# Patient Record
Sex: FEMALE
[De-identification: names, ages, dates, MRNs, and addresses within clinical notes are randomized per-mention and may not be internally consistent; named-entity substitution may affect disease eponyms.]

---

## 2018-03-14 ENCOUNTER — APPOINTMENT (OUTPATIENT)
Dept: INTERNAL MEDICINE | Facility: CLINIC | Age: 33
End: 2018-03-14
Payer: COMMERCIAL

## 2018-03-14 ENCOUNTER — LABORATORY RESULT (OUTPATIENT)
Age: 33
End: 2018-03-14

## 2018-03-14 VITALS
HEIGHT: 66 IN | SYSTOLIC BLOOD PRESSURE: 100 MMHG | OXYGEN SATURATION: 100 % | HEART RATE: 66 BPM | WEIGHT: 144 LBS | BODY MASS INDEX: 23.14 KG/M2 | TEMPERATURE: 98.6 F | DIASTOLIC BLOOD PRESSURE: 66 MMHG

## 2018-03-14 DIAGNOSIS — Z82.3 FAMILY HISTORY OF STROKE: ICD-10-CM

## 2018-03-14 DIAGNOSIS — Z83.3 FAMILY HISTORY OF DIABETES MELLITUS: ICD-10-CM

## 2018-03-14 DIAGNOSIS — Z82.49 FAMILY HISTORY OF ISCHEMIC HEART DISEASE AND OTHER DISEASES OF THE CIRCULATORY SYSTEM: ICD-10-CM

## 2018-03-14 DIAGNOSIS — Z80.0 FAMILY HISTORY OF MALIGNANT NEOPLASM OF DIGESTIVE ORGANS: ICD-10-CM

## 2018-03-14 DIAGNOSIS — Z80.3 FAMILY HISTORY OF MALIGNANT NEOPLASM OF BREAST: ICD-10-CM

## 2018-03-14 PROCEDURE — G0008: CPT

## 2018-03-14 PROCEDURE — 99203 OFFICE O/P NEW LOW 30 MIN: CPT | Mod: 25

## 2018-03-14 PROCEDURE — 36415 COLL VENOUS BLD VENIPUNCTURE: CPT

## 2018-03-14 PROCEDURE — 90688 IIV4 VACCINE SPLT 0.5 ML IM: CPT

## 2018-03-16 LAB
ALBUMIN SERPL ELPH-MCNC: 4.5 G/DL
ALP BLD-CCNC: 43 U/L
ALT SERPL-CCNC: 22 U/L
AMYLASE/CREAT SERPL: 111 U/L
ANION GAP SERPL CALC-SCNC: 12 MMOL/L
AST SERPL-CCNC: 38 U/L
BASOPHILS # BLD AUTO: 0.02 K/UL
BASOPHILS NFR BLD AUTO: 0.4 %
BILIRUB SERPL-MCNC: 0.2 MG/DL
BUN SERPL-MCNC: 8 MG/DL
CALCIUM SERPL-MCNC: 9.2 MG/DL
CHLORIDE SERPL-SCNC: 101 MMOL/L
CO2 SERPL-SCNC: 25 MMOL/L
CREAT SERPL-MCNC: 0.78 MG/DL
EOSINOPHIL # BLD AUTO: 0.1 K/UL
EOSINOPHIL NFR BLD AUTO: 2.2 %
GLUCOSE SERPL-MCNC: 87 MG/DL
HCT VFR BLD CALC: 43.3 %
HGB BLD-MCNC: 13.6 G/DL
IMM GRANULOCYTES NFR BLD AUTO: 0.2 %
LPL SERPL-CCNC: 68 U/L
LYMPHOCYTES # BLD AUTO: 2.1 K/UL
LYMPHOCYTES NFR BLD AUTO: 45.8 %
MAN DIFF?: NORMAL
MCHC RBC-ENTMCNC: 27 PG
MCHC RBC-ENTMCNC: 31.4 GM/DL
MCV RBC AUTO: 85.9 FL
MONOCYTES # BLD AUTO: 0.56 K/UL
MONOCYTES NFR BLD AUTO: 12.2 %
NEUTROPHILS # BLD AUTO: 1.8 K/UL
NEUTROPHILS NFR BLD AUTO: 39.2 %
PLATELET # BLD AUTO: 333 K/UL
POTASSIUM SERPL-SCNC: 3.9 MMOL/L
PROT SERPL-MCNC: 7.7 G/DL
RBC # BLD: 5.04 M/UL
RBC # FLD: 13 %
SODIUM SERPL-SCNC: 138 MMOL/L
WBC # FLD AUTO: 4.59 K/UL

## 2018-03-19 LAB — BACTERIA STL CULT: NORMAL

## 2018-03-20 ENCOUNTER — RESULT REVIEW (OUTPATIENT)
Age: 33
End: 2018-03-20

## 2018-05-06 ENCOUNTER — TRANSCRIPTION ENCOUNTER (OUTPATIENT)
Age: 33
End: 2018-05-06

## 2018-07-05 ENCOUNTER — APPOINTMENT (OUTPATIENT)
Dept: OPHTHALMOLOGY | Facility: CLINIC | Age: 33
End: 2018-07-05
Payer: COMMERCIAL

## 2018-07-05 ENCOUNTER — TRANSCRIPTION ENCOUNTER (OUTPATIENT)
Age: 33
End: 2018-07-05

## 2018-07-05 PROCEDURE — 92250 FUNDUS PHOTOGRAPHY W/I&R: CPT

## 2018-07-05 PROCEDURE — 92004 COMPRE OPH EXAM NEW PT 1/>: CPT

## 2018-09-04 ENCOUNTER — TRANSCRIPTION ENCOUNTER (OUTPATIENT)
Age: 33
End: 2018-09-04

## 2018-09-12 ENCOUNTER — APPOINTMENT (OUTPATIENT)
Dept: INTERNAL MEDICINE | Facility: CLINIC | Age: 33
End: 2018-09-12
Payer: COMMERCIAL

## 2018-09-12 VITALS
SYSTOLIC BLOOD PRESSURE: 100 MMHG | HEIGHT: 66 IN | WEIGHT: 148 LBS | DIASTOLIC BLOOD PRESSURE: 70 MMHG | BODY MASS INDEX: 23.78 KG/M2 | TEMPERATURE: 98.8 F

## 2018-09-12 PROCEDURE — 99213 OFFICE O/P EST LOW 20 MIN: CPT

## 2018-10-11 ENCOUNTER — APPOINTMENT (OUTPATIENT)
Dept: OPHTHALMOLOGY | Facility: CLINIC | Age: 33
End: 2018-10-11

## 2018-10-23 ENCOUNTER — APPOINTMENT (OUTPATIENT)
Dept: OBGYN | Facility: CLINIC | Age: 33
End: 2018-10-23

## 2018-11-01 ENCOUNTER — APPOINTMENT (OUTPATIENT)
Dept: OBGYN | Facility: CLINIC | Age: 33
End: 2018-11-01

## 2018-11-27 ENCOUNTER — APPOINTMENT (OUTPATIENT)
Dept: OPHTHALMOLOGY | Facility: CLINIC | Age: 33
End: 2018-11-27
Payer: COMMERCIAL

## 2018-11-27 PROCEDURE — 92012 INTRM OPH EXAM EST PATIENT: CPT

## 2018-11-27 PROCEDURE — 92083 EXTENDED VISUAL FIELD XM: CPT

## 2018-11-27 PROCEDURE — 92133 CPTRZD OPH DX IMG PST SGM ON: CPT

## 2018-12-11 ENCOUNTER — RESULT REVIEW (OUTPATIENT)
Age: 33
End: 2018-12-11

## 2018-12-12 ENCOUNTER — APPOINTMENT (OUTPATIENT)
Dept: INTERNAL MEDICINE | Facility: CLINIC | Age: 33
End: 2018-12-12
Payer: COMMERCIAL

## 2018-12-12 VITALS
WEIGHT: 148 LBS | DIASTOLIC BLOOD PRESSURE: 70 MMHG | HEIGHT: 66 IN | SYSTOLIC BLOOD PRESSURE: 100 MMHG | HEART RATE: 83 BPM | BODY MASS INDEX: 23.78 KG/M2 | OXYGEN SATURATION: 99 % | TEMPERATURE: 97.8 F

## 2018-12-12 DIAGNOSIS — Z87.09 PERSONAL HISTORY OF OTHER DISEASES OF THE RESPIRATORY SYSTEM: ICD-10-CM

## 2018-12-12 DIAGNOSIS — Z78.9 OTHER SPECIFIED HEALTH STATUS: ICD-10-CM

## 2018-12-12 DIAGNOSIS — B35.1 TINEA UNGUIUM: ICD-10-CM

## 2018-12-12 DIAGNOSIS — Z87.19 PERSONAL HISTORY OF OTHER DISEASES OF THE DIGESTIVE SYSTEM: ICD-10-CM

## 2018-12-12 DIAGNOSIS — Z83.511 FAMILY HISTORY OF GLAUCOMA: ICD-10-CM

## 2018-12-12 DIAGNOSIS — Z23 ENCOUNTER FOR IMMUNIZATION: ICD-10-CM

## 2018-12-12 PROCEDURE — G0008: CPT

## 2018-12-12 PROCEDURE — 90472 IMMUNIZATION ADMIN EACH ADD: CPT

## 2018-12-12 PROCEDURE — 90715 TDAP VACCINE 7 YRS/> IM: CPT

## 2018-12-12 PROCEDURE — 93000 ELECTROCARDIOGRAM COMPLETE: CPT

## 2018-12-12 PROCEDURE — 90674 CCIIV4 VAC NO PRSV 0.5 ML IM: CPT

## 2018-12-12 PROCEDURE — 36415 COLL VENOUS BLD VENIPUNCTURE: CPT

## 2018-12-12 PROCEDURE — 99395 PREV VISIT EST AGE 18-39: CPT | Mod: 25

## 2018-12-12 RX ORDER — FLUTICASONE PROPIONATE 50 UG/1
50 SPRAY, METERED NASAL DAILY
Qty: 1 | Refills: 0 | Status: ACTIVE | COMMUNITY
Start: 2018-12-12 | End: 1900-01-01

## 2018-12-12 RX ORDER — FLUTICASONE PROPIONATE 50 UG/1
50 SPRAY, METERED NASAL TWICE DAILY
Qty: 1 | Refills: 1 | Status: DISCONTINUED | COMMUNITY
Start: 2018-09-12 | End: 2018-12-12

## 2018-12-12 RX ORDER — METHYLPREDNISOLONE 4 MG/1
4 TABLET ORAL
Qty: 1 | Refills: 0 | Status: DISCONTINUED | COMMUNITY
Start: 2018-09-12 | End: 2018-12-12

## 2018-12-12 RX ORDER — LEVOFLOXACIN 500 MG/1
500 TABLET, FILM COATED ORAL DAILY
Qty: 7 | Refills: 1 | Status: DISCONTINUED | COMMUNITY
Start: 2018-09-12 | End: 2018-12-12

## 2018-12-12 RX ORDER — MULTIVITAMIN
TABLET ORAL
Refills: 0 | Status: ACTIVE | COMMUNITY

## 2018-12-12 NOTE — HEALTH RISK ASSESSMENT
[0] : 2) Feeling down, depressed, or hopeless: Not at all (0) [HIV Test offered] : HIV Test offered [MammogramComments] : has appt for mammo, US  [PapSmearDate] : 12/2018 [PapSmearComments] : done yest [de-identified] : last seen dentist 1 mo ago

## 2018-12-12 NOTE — PHYSICAL EXAM
[No Acute Distress] : no acute distress [Well Nourished] : well nourished [Well Developed] : well developed [Well-Appearing] : well-appearing [Normal Sclera/Conjunctiva] : normal sclera/conjunctiva [PERRL] : pupils equal round and reactive to light [Normal Outer Ear/Nose] : the outer ears and nose were normal in appearance [Normal Oropharynx] : the oropharynx was normal [Normal TMs] : both tympanic membranes were normal [Supple] : supple [No Lymphadenopathy] : no lymphadenopathy [No Respiratory Distress] : no respiratory distress  [Clear to Auscultation] : lungs were clear to auscultation bilaterally [No Accessory Muscle Use] : no accessory muscle use [Normal Rate] : normal rate  [Regular Rhythm] : with a regular rhythm [Normal S1, S2] : normal S1 and S2 [No Murmur] : no murmur heard [No Edema] : there was no peripheral edema [Soft] : abdomen soft [Non Tender] : non-tender [Non-distended] : non-distended [No Masses] : no abdominal mass palpated [Normal Bowel Sounds] : normal bowel sounds [Normal Supraclavicular Nodes] : no supraclavicular lymphadenopathy [Normal Axillary Nodes] : no axillary lymphadenopathy [Normal Posterior Cervical Nodes] : no posterior cervical lymphadenopathy [Normal Anterior Cervical Nodes] : no anterior cervical lymphadenopathy [Normal Inguinal Nodes] : no inguinal lymphadenopathy [Grossly Normal Strength/Tone] : grossly normal strength/tone [Coordination Grossly Intact] : coordination grossly intact [No Focal Deficits] : no focal deficits [Normal Affect] : the affect was normal [Normal Insight/Judgement] : insight and judgment were intact [de-identified] : nasal turbinates-mild swelling [de-identified] : R thyroid enlarged [de-identified] : limited flexion.  pain w flexion.  pain over lower L spine.(L3, L4).  good extension of spine

## 2018-12-12 NOTE — HISTORY OF PRESENT ILLNESS
[FreeTextEntry1] : CPE [de-identified] : vaccine-needs tdap vaccine\par diet- healthy\par exercise- ellipitical, wgt, yoga\par \par chronic back-since 2015- worse on bending, lifting.  no radiation of pain.  no weakness or weakness of LExt. no urine or stool incontinence. severity 2/10-7/10. states it started when she was doing crossfit\par \par nasal discharge since 3/18. no sinus pain.  worse w cold temp. no itchy eyes, nose, sneezing.  several sinus infections this yr. \par dry crusty subst. \par no nausea, vomiting or abd pain\par MRI 10/16- mild central stenosis, mild b/l Neural foraminal stenosis L4-L5.  broad base disk bulge  and sm central focal disc protrusion\par \par

## 2018-12-13 ENCOUNTER — TRANSCRIPTION ENCOUNTER (OUTPATIENT)
Age: 33
End: 2018-12-13

## 2018-12-13 ENCOUNTER — RESULT REVIEW (OUTPATIENT)
Age: 33
End: 2018-12-13

## 2018-12-13 LAB
ALBUMIN SERPL ELPH-MCNC: 4.6 G/DL
ALP BLD-CCNC: 40 U/L
ALT SERPL-CCNC: 7 U/L
ANION GAP SERPL CALC-SCNC: 12 MMOL/L
APPEARANCE: CLEAR
AST SERPL-CCNC: 23 U/L
BACTERIA: ABNORMAL
BASOPHILS # BLD AUTO: 0.02 K/UL
BASOPHILS NFR BLD AUTO: 0.4 %
BILIRUB SERPL-MCNC: 0.3 MG/DL
BILIRUBIN URINE: NEGATIVE
BLOOD URINE: NEGATIVE
BUN SERPL-MCNC: 10 MG/DL
CALCIUM SERPL-MCNC: 9.9 MG/DL
CHLORIDE SERPL-SCNC: 105 MMOL/L
CHOLEST SERPL-MCNC: 192 MG/DL
CHOLEST/HDLC SERPL: 3 RATIO
CO2 SERPL-SCNC: 24 MMOL/L
COLOR: YELLOW
CREAT SERPL-MCNC: 0.86 MG/DL
EOSINOPHIL # BLD AUTO: 0.12 K/UL
EOSINOPHIL NFR BLD AUTO: 2.1 %
GLUCOSE QUALITATIVE U: NEGATIVE MG/DL
GLUCOSE SERPL-MCNC: 96 MG/DL
HBV SURFACE AB SER QL: REACTIVE
HBV SURFACE AG SER QL: NONREACTIVE
HCT VFR BLD CALC: 42.6 %
HCV AB SER QL: NONREACTIVE
HCV S/CO RATIO: 0.21 S/CO
HDLC SERPL-MCNC: 65 MG/DL
HGB BLD-MCNC: 13.8 G/DL
HIV1+2 AB SPEC QL IA.RAPID: NONREACTIVE
HSV 1+2 IGG SER IA-IMP: NEGATIVE
HSV 1+2 IGG SER IA-IMP: POSITIVE
HSV1 IGG SER QL: 60.6 INDEX
HSV2 IGG SER QL: 0.66 INDEX
HYALINE CASTS: 2 /LPF
IMM GRANULOCYTES NFR BLD AUTO: 0.2 %
KETONES URINE: NEGATIVE
LDLC SERPL CALC-MCNC: 110 MG/DL
LDLC SERPL DIRECT ASSAY-MCNC: 117 MG/DL
LEUKOCYTE ESTERASE URINE: NEGATIVE
LPL SERPL-CCNC: 61 U/L
LYMPHOCYTES # BLD AUTO: 2.15 K/UL
LYMPHOCYTES NFR BLD AUTO: 38.3 %
MAN DIFF?: NORMAL
MCHC RBC-ENTMCNC: 26.7 PG
MCHC RBC-ENTMCNC: 32.4 GM/DL
MCV RBC AUTO: 82.6 FL
MEV IGG FLD QL IA: 84.1 AU/ML
MEV IGG+IGM SER-IMP: POSITIVE
MICROSCOPIC-UA: NORMAL
MONOCYTES # BLD AUTO: 0.32 K/UL
MONOCYTES NFR BLD AUTO: 5.7 %
MUV AB SER-ACNC: POSITIVE
MUV IGG SER QL IA: 41.9 AU/ML
NEUTROPHILS # BLD AUTO: 2.99 K/UL
NEUTROPHILS NFR BLD AUTO: 53.3 %
NITRITE URINE: NEGATIVE
PH URINE: 8
PLATELET # BLD AUTO: 374 K/UL
POTASSIUM SERPL-SCNC: 4.5 MMOL/L
PROT SERPL-MCNC: 7.6 G/DL
PROTEIN URINE: NEGATIVE MG/DL
RBC # BLD: 5.16 M/UL
RBC # FLD: 12.7 %
RED BLOOD CELLS URINE: 5 /HPF
RUBV IGG FLD-ACNC: 1.5 INDEX
RUBV IGG SER-IMP: POSITIVE
SAVE SPECIMEN: NORMAL
SODIUM SERPL-SCNC: 141 MMOL/L
SPECIFIC GRAVITY URINE: 1.01
SQUAMOUS EPITHELIAL CELLS: 2 /HPF
T PALLIDUM AB SER QL IA: NEGATIVE
T3 SERPL-MCNC: 145 NG/DL
T4 FREE SERPL-MCNC: 1.4 NG/DL
TRIGL SERPL-MCNC: 85 MG/DL
TSH SERPL-ACNC: 3.44 UIU/ML
UROBILINOGEN URINE: NEGATIVE MG/DL
VZV AB TITR SER: POSITIVE
VZV IGG SER IF-ACNC: 241.7 INDEX
WBC # FLD AUTO: 5.61 K/UL
WHITE BLOOD CELLS URINE: 2 /HPF

## 2018-12-18 ENCOUNTER — FORM ENCOUNTER (OUTPATIENT)
Age: 33
End: 2018-12-18

## 2018-12-19 ENCOUNTER — OUTPATIENT (OUTPATIENT)
Dept: OUTPATIENT SERVICES | Facility: HOSPITAL | Age: 33
LOS: 1 days | End: 2018-12-19
Payer: COMMERCIAL

## 2018-12-19 ENCOUNTER — APPOINTMENT (OUTPATIENT)
Dept: ULTRASOUND IMAGING | Facility: IMAGING CENTER | Age: 33
End: 2018-12-19
Payer: COMMERCIAL

## 2018-12-19 DIAGNOSIS — E04.9 NONTOXIC GOITER, UNSPECIFIED: ICD-10-CM

## 2018-12-19 PROCEDURE — 76536 US EXAM OF HEAD AND NECK: CPT | Mod: 26

## 2018-12-19 PROCEDURE — 76536 US EXAM OF HEAD AND NECK: CPT

## 2018-12-20 ENCOUNTER — OUTPATIENT (OUTPATIENT)
Dept: OUTPATIENT SERVICES | Facility: HOSPITAL | Age: 33
LOS: 1 days | End: 2018-12-20
Payer: COMMERCIAL

## 2018-12-20 ENCOUNTER — APPOINTMENT (OUTPATIENT)
Dept: ULTRASOUND IMAGING | Facility: CLINIC | Age: 33
End: 2018-12-20
Payer: COMMERCIAL

## 2018-12-20 ENCOUNTER — APPOINTMENT (OUTPATIENT)
Dept: MAMMOGRAPHY | Facility: CLINIC | Age: 33
End: 2018-12-20
Payer: COMMERCIAL

## 2018-12-20 DIAGNOSIS — Z00.8 ENCOUNTER FOR OTHER GENERAL EXAMINATION: ICD-10-CM

## 2018-12-20 PROCEDURE — 77067 SCR MAMMO BI INCL CAD: CPT | Mod: 26

## 2018-12-20 PROCEDURE — 76641 ULTRASOUND BREAST COMPLETE: CPT

## 2018-12-20 PROCEDURE — 77063 BREAST TOMOSYNTHESIS BI: CPT

## 2018-12-20 PROCEDURE — 76641 ULTRASOUND BREAST COMPLETE: CPT | Mod: 26,50

## 2018-12-20 PROCEDURE — 77063 BREAST TOMOSYNTHESIS BI: CPT | Mod: 26

## 2018-12-20 PROCEDURE — 77067 SCR MAMMO BI INCL CAD: CPT

## 2018-12-21 ENCOUNTER — TRANSCRIPTION ENCOUNTER (OUTPATIENT)
Age: 33
End: 2018-12-21

## 2018-12-24 ENCOUNTER — TRANSCRIPTION ENCOUNTER (OUTPATIENT)
Age: 33
End: 2018-12-24

## 2018-12-31 ENCOUNTER — TRANSCRIPTION ENCOUNTER (OUTPATIENT)
Age: 33
End: 2018-12-31

## 2019-01-02 ENCOUNTER — TRANSCRIPTION ENCOUNTER (OUTPATIENT)
Age: 34
End: 2019-01-02

## 2019-01-03 ENCOUNTER — TRANSCRIPTION ENCOUNTER (OUTPATIENT)
Age: 34
End: 2019-01-03

## 2019-01-04 ENCOUNTER — APPOINTMENT (OUTPATIENT)
Dept: ENDOCRINOLOGY | Facility: CLINIC | Age: 34
End: 2019-01-04
Payer: COMMERCIAL

## 2019-01-04 VITALS
WEIGHT: 152 LBS | DIASTOLIC BLOOD PRESSURE: 70 MMHG | HEART RATE: 81 BPM | OXYGEN SATURATION: 98 % | SYSTOLIC BLOOD PRESSURE: 110 MMHG | HEIGHT: 66 IN | BODY MASS INDEX: 24.43 KG/M2

## 2019-01-04 PROCEDURE — 99243 OFF/OP CNSLTJ NEW/EST LOW 30: CPT

## 2019-01-05 NOTE — PHYSICAL EXAM
[Alert] : alert [No Acute Distress] : no acute distress [Well Nourished] : well nourished [No Proptosis] : no proptosis [Normal Hearing] : hearing was normal [Normal Lips/Gums] : the lips and gums were normal [Normal Oropharynx] : the oropharynx was normal [Supple] : the neck was supple [No LAD] : no lymphadenopathy [No Respiratory Distress] : no respiratory distress [Normal Rate and Effort] : normal respiratory rhythm and effort [No Accessory Muscle Use] : no accessory muscle use [Clear to Auscultation] : lungs were clear to auscultation bilaterally [Normal Rate] : heart rate was normal  [Normal S1, S2] : normal S1 and S2 [Regular Rhythm] : with a regular rhythm [No Edema] : there was no peripheral edema [Normal Bowel Sounds] : normal bowel sounds [Not Tender] : non-tender [Soft] : abdomen soft [Not Distended] : not distended [No CVA Tenderness] : no ~M costovertebral angle tenderness [No Stigmata of Cushings Syndrome] : no stigmata of cushings syndrome [Normal Gait] : normal gait [No Involuntary Movements] : no involuntary movements were seen [No Rash] : no rash [No Tremors] : no tremors [Oriented x3] : oriented to person, place, and time [Normal Affect] : the affect was normal [de-identified] : +R sided thyroid noule

## 2019-01-05 NOTE — HISTORY OF PRESENT ILLNESS
[FreeTextEntry1] : This is a 33 y.o. woman w/  herniated discs here for new pt. visit for thyroid nodule.\par \par She saw PMD who felt something on thyroid exam and sent her for thyroid US which showed thyroid nodules.\par \par She has been feeling good w/o complaints. Weight has been stable. No change in BM's.  No c/o tremors or palpitations. No tremors or palpitations.  No muscle cramping or joint pain. No mood changes. No insomnia. Periods are regular, on Nuva ring.\par \par No change in neck size. No trouble swallowing or breathing. No voice changes. She was born in [place]. No time spent outside the country.  No voice changes. No obstructive changes. No RT to head/neck/chest area. No FH of thyroid CA.\par \par Mother had thyroid nodules.\par \par She works as  an ADA.\par \par

## 2019-01-05 NOTE — ASSESSMENT
[FreeTextEntry1] : This is a 33 y.o. woman w/  herniated discs here for new pt. visit for thyroid nodule.\par -Thyroid US done 12/20/18 showed dominant 2.8cm R sided nodule and 1.2cm L thyroid nodule.\par No risk factors for thyroid CA. Clinically and biochemically euthyroid. She is already scheduled for thyroid FNA.\par \par Will call pt. w/ results

## 2019-01-07 ENCOUNTER — TRANSCRIPTION ENCOUNTER (OUTPATIENT)
Age: 34
End: 2019-01-07

## 2019-01-09 ENCOUNTER — APPOINTMENT (OUTPATIENT)
Dept: SURGICAL ONCOLOGY | Facility: CLINIC | Age: 34
End: 2019-01-09
Payer: COMMERCIAL

## 2019-01-09 ENCOUNTER — LABORATORY RESULT (OUTPATIENT)
Age: 34
End: 2019-01-09

## 2019-01-09 ENCOUNTER — TRANSCRIPTION ENCOUNTER (OUTPATIENT)
Age: 34
End: 2019-01-09

## 2019-01-09 VITALS
HEIGHT: 66 IN | SYSTOLIC BLOOD PRESSURE: 106 MMHG | WEIGHT: 151 LBS | RESPIRATION RATE: 15 BRPM | BODY MASS INDEX: 24.27 KG/M2 | DIASTOLIC BLOOD PRESSURE: 71 MMHG | HEART RATE: 86 BPM

## 2019-01-09 PROCEDURE — 99244 OFF/OP CNSLTJ NEW/EST MOD 40: CPT | Mod: 25

## 2019-01-09 PROCEDURE — 60100 BIOPSY OF THYROID: CPT

## 2019-01-09 NOTE — ASSESSMENT
[FreeTextEntry1] : Bilateral thyroid nodules\par S/p FNA performed today\par Will f/u pathology\par Thyroid ultrasound in 6 months if benign

## 2019-01-09 NOTE — ADDENDUM
[FreeTextEntry1] : I, Katie Claire, acted solely as a scribe for Dr. Mansoor Valentino on this date 1/9/19.

## 2019-01-09 NOTE — CONSULT LETTER
[Dear  ___] : Dear  [unfilled], [Consult Letter:] : I had the pleasure of evaluating your patient, [unfilled]. [Please see my note below.] : Please see my note below. [Sincerely,] : Sincerely, [FreeTextEntry3] : Mansoor Valentino MD FACS

## 2019-01-09 NOTE — PHYSICAL EXAM
[Normal Neck Lymph Nodes] : normal neck lymph nodes  [Normal Supraclavicular Lymph Nodes] : normal supraclavicular lymph nodes [Normal Groin Lymph Nodes] : normal groin lymph nodes [Normal Axillary Lymph Nodes] : normal axillary lymph nodes [Normal] : oriented to person, place and time, with appropriate affect [de-identified] : 3 cm right thyroid mass. No adenopathy.

## 2019-01-09 NOTE — HISTORY OF PRESENT ILLNESS
[de-identified] : 34 y/o female presents for initial consultation for thyroid nodule. \par Her enlarged thyroid was first noted by her PCP. \par \par Thyroid US 12/19/18: right lobe: 2.8 cm midpole hyperechoic nodule. Left lobe: multiple nodules. A reference 1.2 cm heterogeneously hypoechoic upper pole nodule. \par \par No FMHx of thyroid cancer.

## 2019-01-11 ENCOUNTER — APPOINTMENT (OUTPATIENT)
Dept: INTERNAL MEDICINE | Facility: CLINIC | Age: 34
End: 2019-01-11

## 2019-01-11 ENCOUNTER — LABORATORY RESULT (OUTPATIENT)
Age: 34
End: 2019-01-11

## 2019-01-22 ENCOUNTER — TRANSCRIPTION ENCOUNTER (OUTPATIENT)
Age: 34
End: 2019-01-22

## 2019-01-23 ENCOUNTER — TRANSCRIPTION ENCOUNTER (OUTPATIENT)
Age: 34
End: 2019-01-23

## 2019-01-24 ENCOUNTER — TRANSCRIPTION ENCOUNTER (OUTPATIENT)
Age: 34
End: 2019-01-24

## 2019-01-24 ENCOUNTER — APPOINTMENT (OUTPATIENT)
Dept: ENDOCRINOLOGY | Facility: CLINIC | Age: 34
End: 2019-01-24

## 2019-02-01 ENCOUNTER — APPOINTMENT (OUTPATIENT)
Dept: OTOLARYNGOLOGY | Facility: CLINIC | Age: 34
End: 2019-02-01
Payer: COMMERCIAL

## 2019-02-01 VITALS
BODY MASS INDEX: 24.27 KG/M2 | HEART RATE: 80 BPM | DIASTOLIC BLOOD PRESSURE: 73 MMHG | HEIGHT: 66 IN | WEIGHT: 151 LBS | SYSTOLIC BLOOD PRESSURE: 108 MMHG

## 2019-02-01 PROCEDURE — 99204 OFFICE O/P NEW MOD 45 MIN: CPT | Mod: 25

## 2019-02-01 PROCEDURE — 31231 NASAL ENDOSCOPY DX: CPT

## 2019-02-01 NOTE — PHYSICAL EXAM
[Nasal Endoscopy Performed] : nasal endoscopy was performed, see procedure section for findings [] : septum deviated bilaterally [de-identified] : congested [Midline] : trachea located in midline position [Normal] : no rashes

## 2019-02-01 NOTE — ASSESSMENT
[FreeTextEntry1] : DNS and Rhinitis\par Rx\par   Steam humidification and hypertonic saline nasal irrigations \par rx-medrol dospak\par \par f/u 2 weeks\par \par

## 2019-02-01 NOTE — HISTORY OF PRESENT ILLNESS
[No] : patient does not have a  history of radiation therapy [de-identified] : 32 yo female\par chr moderate nasal congestion w/ nasal crusting x yrs despite Flonase tx\par recurrent sinusitis-3x/yr x yrs\par tx w/ abx therapy\par no h/o nasal surg or trauma\par no other modifying factors\par no ear or throat complaints\par  [Tinnitus] : no tinnitus [Vertigo] : no vertigo [Anxiety] : no anxiety [Dizziness] : no dizziness [Hearing Loss] : no hearing loss [Recurrent Otitis Media] : no recurrent otitis media [Otitis Media with Effusion] : no otitis media with effusion [Congenital Ear Malformation] : no congenital ear malformation [Meniere Disease] : no Meniere disease [Otosclerosis] : no otosclerosis [Perilymphatic Fistula] : no perilymphatic fistula [Hypertension] : no hypertension [Loud Noise Exposure] : no history of loud noise exposure [Smoking] : no smoking [Early Onset Hearing Loss] : no early onset hearing loss [Stroke] : no stroke [Facial Pain] : no facial pain [Headache] : no headache [Facial Pressure] : facial pressure [Retro-Orbital Pain] : no retro-orbital pain [Nasal Congestion] : nasal congestion [Dental Pain] : no dental pain [Postnasal Drainage] : postnasal drainage [Ear Fullness] : no ear fullness [Allergic Rhinitis] : no allergic rhinitis [Environmental Allergies] : no environmental allergies [Seasonal Allergies] : no seasonal allergies [Environmental Allergens] : no environmental allergens [Adenoidectomy] : no adenoidectomy [Allergies] : no allergies [Asthma] : no asthma [Neck Mass] : no neck mass [Neck Pain] : no neck pain [Chills] : no chills [Cold Intolerance] : no cold intolerance [Cough] : no cough [Fatigue] : no fatigue [Heat Intolerance] : no heat intolerance [Hyperthyroidism] : no hyperthyroidism [Sialadenitis] : no sialadenitis [Hodgkin Disease] : no hodgkin disease [Non-Hodgkin Lymphoma] : no non-hodgkin lymphoma [None] : No risk factors have been identified. [Tobacco Use] : no tobacco use [Alcohol Use] : no alcohol use [Graves Disease] : no graves disease [Thyroid Cancer] : no thyroid cancer

## 2019-02-12 ENCOUNTER — APPOINTMENT (OUTPATIENT)
Dept: INTERNAL MEDICINE | Facility: CLINIC | Age: 34
End: 2019-02-12
Payer: COMMERCIAL

## 2019-02-12 PROCEDURE — 90632 HEPA VACCINE ADULT IM: CPT

## 2019-02-12 PROCEDURE — 90471 IMMUNIZATION ADMIN: CPT

## 2019-02-15 ENCOUNTER — TRANSCRIPTION ENCOUNTER (OUTPATIENT)
Age: 34
End: 2019-02-15

## 2019-02-20 ENCOUNTER — APPOINTMENT (OUTPATIENT)
Dept: PHYSICAL MEDICINE AND REHAB | Facility: CLINIC | Age: 34
End: 2019-02-20
Payer: COMMERCIAL

## 2019-02-20 ENCOUNTER — TRANSCRIPTION ENCOUNTER (OUTPATIENT)
Age: 34
End: 2019-02-20

## 2019-02-20 VITALS
SYSTOLIC BLOOD PRESSURE: 105 MMHG | OXYGEN SATURATION: 98 % | DIASTOLIC BLOOD PRESSURE: 72 MMHG | TEMPERATURE: 97.9 F | HEART RATE: 99 BPM

## 2019-02-20 PROCEDURE — 99203 OFFICE O/P NEW LOW 30 MIN: CPT

## 2019-02-22 ENCOUNTER — TRANSCRIPTION ENCOUNTER (OUTPATIENT)
Age: 34
End: 2019-02-22

## 2019-02-27 NOTE — HISTORY OF PRESENT ILLNESS
[FreeTextEntry1] : Ms. CHIDI BUCHANAN is a 33 year year old female here for evaluation of low back pain. Back pain started a few years ago, she was doing crossfit around that time. Since then, developed axial low back pain which was worse with FF. She had seen orthopedics, had MRI demonstrating mild DJD at L4/5, L5/S1, mild disc protrusions. She had started some yoga, no dedicated PT.\par \par Location: axial low back\par Duration: chronic\par Inciting Event/Context: in the setting of crossfit\par Onset/Timing: constant\par Quality: sharp\par Severity: 6/10 \par Exacerbating factors: worse with lifting\par Relieving factors: advil\par Radiation: denies into her lower extremties\par Numbness/Tingling: denies\par Bowel/Bladder neuroloical incontinence: denies\par lower ext. weakness: denies\par \par Prior Treatments:\par Active Modalities/Exercise: no dedicated PT\par Injections: no prior injections\par Surgeries: no prior lumbar surgery\par Medications: NSAIDs prn with imrpovement\par Imaging: MRI as above

## 2019-02-27 NOTE — ASSESSMENT
[FreeTextEntry1] : Ms. BUCHANAN is a 33 year old woman here for evaluation of low back pain. Based on the clinical evaluation, review of available imaging, pain is likely secondary to discogenic low back pain, myofascial pain, annular tear. Recommend course of PT for core strengthening, stretching of bilateral LE. Would recommend avoid NSAIDs in the setting of family planning. Follow up after PT is complete\par

## 2019-02-27 NOTE — PHYSICAL EXAM
[FreeTextEntry1] : General: NAD, alert\par Psych: normal mood and affect\par HEENT: NC/AT, normal visual tracking\par Pulmonary: no resp distress, chest expansion appears symmetrical\par CV: extremities are warm and perfused\par Abd: non-distended\par Ext: no c/c/e\par normal skin color and appearance\par \par Lumbar/Hip Spine:\par Inspection: normal muscle bulk without asymmetry\par Tenderness to palpation: mild TTP over the PSIS, lumbar paraspinal, no TTP over the greater trochanter, sacroiliac joints,  piriformis\par ROM: within functional limits and with pain in flexion\par MMT: 5/5 bilateral lower extremities\par Reflexes: symmetric bilateral achilles and patella \par Sensory: intact to light touch in all dermatomes of the bilateral lower extremities.\par Provacative testing:\par Straight leg raise with pain in the back\par Boyce's test negative\par RG/FADIR test negative\par SI provacative testing negative

## 2019-03-02 ENCOUNTER — TRANSCRIPTION ENCOUNTER (OUTPATIENT)
Age: 34
End: 2019-03-02

## 2019-03-13 ENCOUNTER — APPOINTMENT (OUTPATIENT)
Dept: MRI IMAGING | Facility: CLINIC | Age: 34
End: 2019-03-13

## 2019-03-27 ENCOUNTER — APPOINTMENT (OUTPATIENT)
Dept: OTOLARYNGOLOGY | Facility: CLINIC | Age: 34
End: 2019-03-27
Payer: COMMERCIAL

## 2019-03-27 VITALS
DIASTOLIC BLOOD PRESSURE: 73 MMHG | HEIGHT: 66 IN | SYSTOLIC BLOOD PRESSURE: 106 MMHG | WEIGHT: 151 LBS | BODY MASS INDEX: 24.27 KG/M2 | HEART RATE: 74 BPM

## 2019-03-27 PROCEDURE — 31231 NASAL ENDOSCOPY DX: CPT

## 2019-03-27 PROCEDURE — 99214 OFFICE O/P EST MOD 30 MIN: CPT | Mod: 25

## 2019-03-28 NOTE — REASON FOR VISIT
[Subsequent Evaluation] : a subsequent evaluation for [Nasal Obstruction] : nasal obstruction [FreeTextEntry2] : sinus issues

## 2019-03-28 NOTE — PHYSICAL EXAM
[Nasal Endoscopy Performed] : nasal endoscopy was performed, see procedure section for findings [] : septum deviated bilaterally [Midline] : trachea located in midline position [Normal] : no rashes [de-identified] : congested

## 2019-03-28 NOTE — HISTORY OF PRESENT ILLNESS
[No] : patient does not have a  history of radiation therapy [Facial Pressure] : facial pressure [Nasal Congestion] : nasal congestion [Postnasal Drainage] : postnasal drainage [None] : No risk factors have been identified. [de-identified] : 34 yo female w/ hx of chr moderate nasal congestion w/ nasal crusting x yrs despite Flonase tx is here for f/u. She states the nasal crusting improved on the saline spray but she is still has mild to moderate nasal congestion. She states her nasal discharge is softer, but she is still symptomatic every morning. She will have blood tinged nasal discharge intermittently. She has mild to moderate maxillary pressure. She states the flonase helps better with her facial pressure and nasal congestion. She states when her congestion is bad she will have frontal, ethmoid and maxillary pressure and pain.  Pt has no ear pain, ear drainage, hearing loss, tinnitus, vertigo,  epistaxis, sinus infections, facial pain, f throat pain, dysphagia or fevers\par \par  [Tinnitus] : no tinnitus [Vertigo] : no vertigo [Anxiety] : no anxiety [Dizziness] : no dizziness [Hearing Loss] : no hearing loss [Recurrent Otitis Media] : no recurrent otitis media [Otitis Media with Effusion] : no otitis media with effusion [Congenital Ear Malformation] : no congenital ear malformation [Meniere Disease] : no Meniere disease [Otosclerosis] : no otosclerosis [Perilymphatic Fistula] : no perilymphatic fistula [Hypertension] : no hypertension [Loud Noise Exposure] : no history of loud noise exposure [Smoking] : no smoking [Early Onset Hearing Loss] : no early onset hearing loss [Stroke] : no stroke [Facial Pain] : no facial pain [Headache] : no headache [Retro-Orbital Pain] : no retro-orbital pain [Dental Pain] : no dental pain [Ear Fullness] : no ear fullness [Allergic Rhinitis] : no allergic rhinitis [Environmental Allergies] : no environmental allergies [Seasonal Allergies] : no seasonal allergies [Environmental Allergens] : no environmental allergens [Adenoidectomy] : no adenoidectomy [Allergies] : no allergies [Asthma] : no asthma [Neck Mass] : no neck mass [Neck Pain] : no neck pain [Chills] : no chills [Cold Intolerance] : no cold intolerance [Cough] : no cough [Fatigue] : no fatigue [Heat Intolerance] : no heat intolerance [Hyperthyroidism] : no hyperthyroidism [Sialadenitis] : no sialadenitis [Hodgkin Disease] : no hodgkin disease [Non-Hodgkin Lymphoma] : no non-hodgkin lymphoma [Tobacco Use] : no tobacco use [Alcohol Use] : no alcohol use [Graves Disease] : no graves disease [Thyroid Cancer] : no thyroid cancer

## 2019-03-28 NOTE — ASSESSMENT
[FreeTextEntry1] : DNS and Rhinitis\par Rx\par   Steam humidification and hypertonic saline nasal irrigations and flonase on a daily baisis \par \par -If symptoms do not improve will get a CT of sinuses \par \par -Pt will call in 1 month to f/u \par \par

## 2019-04-13 ENCOUNTER — TRANSCRIPTION ENCOUNTER (OUTPATIENT)
Age: 34
End: 2019-04-13

## 2019-04-29 ENCOUNTER — APPOINTMENT (OUTPATIENT)
Dept: OTOLARYNGOLOGY | Facility: CLINIC | Age: 34
End: 2019-04-29
Payer: COMMERCIAL

## 2019-04-29 VITALS
HEIGHT: 66 IN | DIASTOLIC BLOOD PRESSURE: 71 MMHG | SYSTOLIC BLOOD PRESSURE: 115 MMHG | HEART RATE: 93 BPM | BODY MASS INDEX: 24.11 KG/M2 | WEIGHT: 150 LBS

## 2019-04-29 DIAGNOSIS — J31.0 CHRONIC RHINITIS: ICD-10-CM

## 2019-04-29 DIAGNOSIS — J34.2 DEVIATED NASAL SEPTUM: ICD-10-CM

## 2019-04-29 PROCEDURE — 31231 NASAL ENDOSCOPY DX: CPT

## 2019-04-29 PROCEDURE — 99214 OFFICE O/P EST MOD 30 MIN: CPT | Mod: 25

## 2019-04-29 NOTE — ASSESSMENT
[FreeTextEntry1] : Resolved sinusitis but w/ wheezing\par rx-medrol dospak\par \par DNS and Rhinitis\par Rx\par   Steam humidification and hypertonic saline nasal irrigations and flonase on a daily baisis \par \par -If symptoms do not improve will get a CT of sinuses \par \par \par \par

## 2019-04-29 NOTE — PHYSICAL EXAM
[Nasal Endoscopy Performed] : nasal endoscopy was performed, see procedure section for findings [] : septum deviated bilaterally [de-identified] : congested [Midline] : trachea located in midline position [Normal] : no rashes

## 2019-04-29 NOTE — HISTORY OF PRESENT ILLNESS
[No] : patient does not have a  history of radiation therapy [de-identified] : 35 yo female \par recent sinusitis tx w/ levaquin\par Still fels tightness in chest but cllean nasal passages\par w/ hx of chr moderate nasal congestion w/ nasal crusting x yrs despite Flonase tx is here for f/u. She states the nasal crusting improved on the saline spray but she is still has mild to moderate nasal congestion. She states her nasal discharge is softer, but she is still symptomatic every morning. She will have blood tinged nasal discharge intermittently. She has mild to moderate maxillary pressure. She states the flonase helps better with her facial pressure and nasal congestion. She states when her congestion is bad she will have frontal, ethmoid and maxillary pressure and pain.  Pt has no ear pain, ear drainage, hearing loss, tinnitus, vertigo,  epistaxis, sinus infections, facial pain, f throat pain, dysphagia or fevers\par \par  [Tinnitus] : no tinnitus [Vertigo] : no vertigo [Anxiety] : no anxiety [Dizziness] : no dizziness [Hearing Loss] : no hearing loss [Recurrent Otitis Media] : no recurrent otitis media [Otitis Media with Effusion] : no otitis media with effusion [Congenital Ear Malformation] : no congenital ear malformation [Meniere Disease] : no Meniere disease [Otosclerosis] : no otosclerosis [Perilymphatic Fistula] : no perilymphatic fistula [Hypertension] : no hypertension [Loud Noise Exposure] : no history of loud noise exposure [Smoking] : no smoking [Early Onset Hearing Loss] : no early onset hearing loss [Stroke] : no stroke [Facial Pain] : no facial pain [Headache] : no headache [Facial Pressure] : facial pressure [Retro-Orbital Pain] : no retro-orbital pain [Nasal Congestion] : nasal congestion [Dental Pain] : no dental pain [Postnasal Drainage] : postnasal drainage [Ear Fullness] : no ear fullness [Allergic Rhinitis] : no allergic rhinitis [Environmental Allergies] : no environmental allergies [Seasonal Allergies] : no seasonal allergies [Environmental Allergens] : no environmental allergens [Adenoidectomy] : no adenoidectomy [Allergies] : no allergies [Asthma] : no asthma [Neck Mass] : no neck mass [Neck Pain] : no neck pain [Chills] : no chills [Cold Intolerance] : no cold intolerance [Cough] : no cough [Fatigue] : no fatigue [Heat Intolerance] : no heat intolerance [Hyperthyroidism] : no hyperthyroidism [Sialadenitis] : no sialadenitis [Hodgkin Disease] : no hodgkin disease [Non-Hodgkin Lymphoma] : no non-hodgkin lymphoma [None] : No risk factors have been identified. [Tobacco Use] : no tobacco use [Alcohol Use] : no alcohol use [Graves Disease] : no graves disease [Thyroid Cancer] : no thyroid cancer

## 2019-04-30 ENCOUNTER — TRANSCRIPTION ENCOUNTER (OUTPATIENT)
Age: 34
End: 2019-04-30

## 2019-05-21 ENCOUNTER — APPOINTMENT (OUTPATIENT)
Dept: OPHTHALMOLOGY | Facility: CLINIC | Age: 34
End: 2019-05-21
Payer: COMMERCIAL

## 2019-05-21 PROCEDURE — 92083 EXTENDED VISUAL FIELD XM: CPT

## 2019-05-21 PROCEDURE — 92012 INTRM OPH EXAM EST PATIENT: CPT

## 2019-05-21 PROCEDURE — ZZZZZ: CPT

## 2019-05-21 PROCEDURE — 92133 CPTRZD OPH DX IMG PST SGM ON: CPT

## 2019-07-01 ENCOUNTER — APPOINTMENT (OUTPATIENT)
Dept: ULTRASOUND IMAGING | Facility: IMAGING CENTER | Age: 34
End: 2019-07-01
Payer: COMMERCIAL

## 2019-07-01 ENCOUNTER — OUTPATIENT (OUTPATIENT)
Dept: OUTPATIENT SERVICES | Facility: HOSPITAL | Age: 34
LOS: 1 days | End: 2019-07-01
Payer: COMMERCIAL

## 2019-07-01 DIAGNOSIS — E04.1 NONTOXIC SINGLE THYROID NODULE: ICD-10-CM

## 2019-07-01 PROCEDURE — 76536 US EXAM OF HEAD AND NECK: CPT

## 2019-07-01 PROCEDURE — 76536 US EXAM OF HEAD AND NECK: CPT | Mod: 26

## 2019-07-03 ENCOUNTER — APPOINTMENT (OUTPATIENT)
Dept: PHYSICAL MEDICINE AND REHAB | Facility: CLINIC | Age: 34
End: 2019-07-03
Payer: COMMERCIAL

## 2019-07-03 VITALS
DIASTOLIC BLOOD PRESSURE: 77 MMHG | SYSTOLIC BLOOD PRESSURE: 111 MMHG | TEMPERATURE: 97.6 F | OXYGEN SATURATION: 100 % | HEART RATE: 89 BPM

## 2019-07-03 DIAGNOSIS — M51.36 OTHER INTERVERTEBRAL DISC DEGENERATION, LUMBAR REGION: ICD-10-CM

## 2019-07-03 PROCEDURE — 99213 OFFICE O/P EST LOW 20 MIN: CPT

## 2019-07-03 RX ORDER — METHYLPREDNISOLONE 4 MG/1
4 TABLET ORAL
Qty: 1 | Refills: 0 | Status: DISCONTINUED | COMMUNITY
Start: 2019-04-29 | End: 2019-07-03

## 2019-07-03 NOTE — HISTORY OF PRESENT ILLNESS
[FreeTextEntry1] : Ms. CHIDI BUCHANAN is a 34 year year old female here for follow up of low back pain. She had completed PT with improvement.  \par \par Location: axial low back\par Duration: chronic\par Inciting Event/Context: in the setting of crossfit\par Onset/Timing: intermittent\par Quality: dull\par Severity: 1-2/10 \par Exacerbating factors: worse with lifting\par Relieving factors: stretches, NSAID\par Radiation: denies into her lower extremties\par Numbness/Tingling: denies\par Bowel/Bladder neuroloical incontinence: denies\par lower ext. weakness: denies\par \par Prior Treatments:\par Active Modalities/Exercise: completed PT - transitioned to HEP\par Injections: no prior injections\par Surgeries: no prior lumbar surgery\par Medications: Advil prn with imrpovement\par Imaging: L spine MRI -mild DJD at L4/5, L5/S1, mild disc protrusions.

## 2019-07-03 NOTE — PHYSICAL EXAM
[FreeTextEntry1] : General: NAD, alert\par Psych: normal mood and affect\par HEENT: NC/AT, normal visual tracking\par Pulmonary: no resp distress, chest expansion appears symmetrical\par CV: extremities are warm and perfused\par Abd: non-distended\par Ext: no c/c/e\par Skin: normal color, appearance, and temperature\par \par Lumbar/Hip Spine:\par Inspection: normal muscle bulk without asymmetry\par Tenderness to palpation: mild TTP over right lumbar paraspinal, no TTP over the PSIS, greater trochanter, sacroiliac joints,  piriformis\par ROM: within functional limits \par MMT: 5/5 bilateral lower extremities\par Reflexes: symmetric bilateral achilles and patella \par Sensory: intact to light touch in all dermatomes of the bilateral lower extremities.\par Provocative testing:\par Straight leg raise test negative\par Boyce's test negative\par RG/FADIR test negative\par SI provocative testing negative

## 2019-07-03 NOTE — ASSESSMENT
[FreeTextEntry1] : Ms. BUCHANAN is a 34 year old woman here for follow up of low back pain secondary to discogenic low back pain, myofascial pain, annular tear.  Relief with conservative care of PT and NSAID.  In the setting of family planning she was advised to avoid NSAID.  Can use tylenol, warm/cold compress, and/or typical agents like icy/hot.  She was encouraged to continue with HEP.  She may follow up as needed.

## 2019-07-05 ENCOUNTER — TRANSCRIPTION ENCOUNTER (OUTPATIENT)
Age: 34
End: 2019-07-05

## 2019-09-03 ENCOUNTER — APPOINTMENT (OUTPATIENT)
Dept: ENDOCRINOLOGY | Facility: CLINIC | Age: 34
End: 2019-09-03

## 2019-09-18 ENCOUNTER — APPOINTMENT (OUTPATIENT)
Dept: OTOLARYNGOLOGY | Facility: CLINIC | Age: 34
End: 2019-09-18

## 2019-09-28 ENCOUNTER — TRANSCRIPTION ENCOUNTER (OUTPATIENT)
Age: 34
End: 2019-09-28

## 2019-10-16 ENCOUNTER — TRANSCRIPTION ENCOUNTER (OUTPATIENT)
Age: 34
End: 2019-10-16

## 2019-10-17 ENCOUNTER — TRANSCRIPTION ENCOUNTER (OUTPATIENT)
Age: 34
End: 2019-10-17

## 2019-12-06 ENCOUNTER — TRANSCRIPTION ENCOUNTER (OUTPATIENT)
Age: 34
End: 2019-12-06

## 2019-12-17 ENCOUNTER — TRANSCRIPTION ENCOUNTER (OUTPATIENT)
Age: 34
End: 2019-12-17

## 2019-12-17 ENCOUNTER — APPOINTMENT (OUTPATIENT)
Dept: OPHTHALMOLOGY | Facility: CLINIC | Age: 34
End: 2019-12-17
Payer: COMMERCIAL

## 2019-12-17 ENCOUNTER — NON-APPOINTMENT (OUTPATIENT)
Age: 34
End: 2019-12-17

## 2019-12-17 PROCEDURE — 92133 CPTRZD OPH DX IMG PST SGM ON: CPT

## 2019-12-17 PROCEDURE — 92083 EXTENDED VISUAL FIELD XM: CPT

## 2019-12-17 PROCEDURE — 92014 COMPRE OPH EXAM EST PT 1/>: CPT

## 2019-12-17 PROCEDURE — 76514 ECHO EXAM OF EYE THICKNESS: CPT

## 2019-12-19 ENCOUNTER — TRANSCRIPTION ENCOUNTER (OUTPATIENT)
Age: 34
End: 2019-12-19

## 2019-12-19 LAB
T4 FREE SERPL-MCNC: 1 NG/DL
TSH SERPL-ACNC: 2.4 UIU/ML

## 2019-12-20 ENCOUNTER — TRANSCRIPTION ENCOUNTER (OUTPATIENT)
Age: 34
End: 2019-12-20

## 2019-12-27 LAB — T4 FREE SERPL DIALY-MCNC: 1.2 NG/DL

## 2020-06-01 ENCOUNTER — RESULT REVIEW (OUTPATIENT)
Age: 35
End: 2020-06-01

## 2020-06-05 ENCOUNTER — APPOINTMENT (OUTPATIENT)
Dept: ULTRASOUND IMAGING | Facility: IMAGING CENTER | Age: 35
End: 2020-06-05
Payer: COMMERCIAL

## 2020-06-05 ENCOUNTER — OUTPATIENT (OUTPATIENT)
Dept: OUTPATIENT SERVICES | Facility: HOSPITAL | Age: 35
LOS: 1 days | End: 2020-06-05
Payer: COMMERCIAL

## 2020-06-05 ENCOUNTER — APPOINTMENT (OUTPATIENT)
Dept: MAMMOGRAPHY | Facility: IMAGING CENTER | Age: 35
End: 2020-06-05
Payer: COMMERCIAL

## 2020-06-05 DIAGNOSIS — Z00.8 ENCOUNTER FOR OTHER GENERAL EXAMINATION: ICD-10-CM

## 2020-06-05 PROCEDURE — 77063 BREAST TOMOSYNTHESIS BI: CPT | Mod: 26

## 2020-06-05 PROCEDURE — 76641 ULTRASOUND BREAST COMPLETE: CPT

## 2020-06-05 PROCEDURE — 76641 ULTRASOUND BREAST COMPLETE: CPT | Mod: 26,50

## 2020-06-05 PROCEDURE — 77063 BREAST TOMOSYNTHESIS BI: CPT

## 2020-06-05 PROCEDURE — 77067 SCR MAMMO BI INCL CAD: CPT | Mod: 26

## 2020-06-05 PROCEDURE — 77067 SCR MAMMO BI INCL CAD: CPT

## 2020-06-11 ENCOUNTER — APPOINTMENT (OUTPATIENT)
Dept: INTERVENTIONAL RADIOLOGY/VASCULAR | Facility: CLINIC | Age: 35
End: 2020-06-11
Payer: COMMERCIAL

## 2020-06-11 ENCOUNTER — OUTPATIENT (OUTPATIENT)
Dept: OUTPATIENT SERVICES | Facility: HOSPITAL | Age: 35
LOS: 1 days | End: 2020-06-11

## 2020-06-11 DIAGNOSIS — Z31.41 ENCOUNTER FOR FERTILITY TESTING: ICD-10-CM

## 2020-06-11 PROCEDURE — 58340 CATHETER FOR HYSTEROGRAPHY: CPT

## 2020-06-11 PROCEDURE — 74740 X-RAY FEMALE GENITAL TRACT: CPT | Mod: 26

## 2020-06-23 ENCOUNTER — APPOINTMENT (OUTPATIENT)
Dept: OPHTHALMOLOGY | Facility: CLINIC | Age: 35
End: 2020-06-23
Payer: COMMERCIAL

## 2020-06-23 ENCOUNTER — NON-APPOINTMENT (OUTPATIENT)
Age: 35
End: 2020-06-23

## 2020-06-23 PROCEDURE — 92133 CPTRZD OPH DX IMG PST SGM ON: CPT

## 2020-06-23 PROCEDURE — 92012 INTRM OPH EXAM EST PATIENT: CPT

## 2020-06-26 ENCOUNTER — TRANSCRIPTION ENCOUNTER (OUTPATIENT)
Age: 35
End: 2020-06-26

## 2020-07-08 ENCOUNTER — NON-APPOINTMENT (OUTPATIENT)
Age: 35
End: 2020-07-08

## 2020-07-08 ENCOUNTER — APPOINTMENT (OUTPATIENT)
Dept: INTERNAL MEDICINE | Facility: CLINIC | Age: 35
End: 2020-07-08
Payer: COMMERCIAL

## 2020-07-08 VITALS
OXYGEN SATURATION: 97 % | HEART RATE: 67 BPM | WEIGHT: 157 LBS | BODY MASS INDEX: 25.23 KG/M2 | TEMPERATURE: 97.6 F | SYSTOLIC BLOOD PRESSURE: 98 MMHG | HEIGHT: 66 IN | DIASTOLIC BLOOD PRESSURE: 70 MMHG

## 2020-07-08 DIAGNOSIS — Z92.29 PERSONAL HISTORY OF OTHER DRUG THERAPY: ICD-10-CM

## 2020-07-08 DIAGNOSIS — Z86.39 PERSONAL HISTORY OF OTHER ENDOCRINE, NUTRITIONAL AND METABOLIC DISEASE: ICD-10-CM

## 2020-07-08 DIAGNOSIS — Z01.84 ENCOUNTER FOR ANTIBODY RESPONSE EXAMINATION: ICD-10-CM

## 2020-07-08 DIAGNOSIS — R82.90 UNSPECIFIED ABNORMAL FINDINGS IN URINE: ICD-10-CM

## 2020-07-08 DIAGNOSIS — Z11.3 ENCOUNTER FOR SCREENING FOR INFECTIONS WITH A PREDOMINANTLY SEXUAL MODE OF TRANSMISSION: ICD-10-CM

## 2020-07-08 DIAGNOSIS — Z23 ENCOUNTER FOR IMMUNIZATION: ICD-10-CM

## 2020-07-08 DIAGNOSIS — R74.8 ABNORMAL LEVELS OF OTHER SERUM ENZYMES: ICD-10-CM

## 2020-07-08 DIAGNOSIS — R94.6 ABNORMAL RESULTS OF THYROID FUNCTION STUDIES: ICD-10-CM

## 2020-07-08 PROCEDURE — 93000 ELECTROCARDIOGRAM COMPLETE: CPT

## 2020-07-08 PROCEDURE — 90632 HEPA VACCINE ADULT IM: CPT

## 2020-07-08 PROCEDURE — 36415 COLL VENOUS BLD VENIPUNCTURE: CPT

## 2020-07-08 PROCEDURE — 90471 IMMUNIZATION ADMIN: CPT

## 2020-07-08 PROCEDURE — 99395 PREV VISIT EST AGE 18-39: CPT | Mod: 25

## 2020-07-08 NOTE — PHYSICAL EXAM
[No Acute Distress] : no acute distress [Well Nourished] : well nourished [Well Developed] : well developed [Well-Appearing] : well-appearing [PERRL] : pupils equal round and reactive to light [Normal Sclera/Conjunctiva] : normal sclera/conjunctiva [Normal TMs] : both tympanic membranes were normal [Normal Outer Ear/Nose] : the outer ears and nose were normal in appearance [Normal Oropharynx] : the oropharynx was normal [Supple] : supple [No Lymphadenopathy] : no lymphadenopathy [No Respiratory Distress] : no respiratory distress  [No Accessory Muscle Use] : no accessory muscle use [Regular Rhythm] : with a regular rhythm [Clear to Auscultation] : lungs were clear to auscultation bilaterally [Normal Rate] : normal rate  [No Murmur] : no murmur heard [Normal S1, S2] : normal S1 and S2 [No Edema] : there was no peripheral edema [Non Tender] : non-tender [Soft] : abdomen soft [No Masses] : no abdominal mass palpated [Non-distended] : non-distended [Normal Bowel Sounds] : normal bowel sounds [Normal Supraclavicular Nodes] : no supraclavicular lymphadenopathy [Normal Axillary Nodes] : no axillary lymphadenopathy [Normal Anterior Cervical Nodes] : no anterior cervical lymphadenopathy [Normal Posterior Cervical Nodes] : no posterior cervical lymphadenopathy [Normal Inguinal Nodes] : no inguinal lymphadenopathy [Grossly Normal Strength/Tone] : grossly normal strength/tone [No Focal Deficits] : no focal deficits [Normal Gait] : normal gait [Normal Insight/Judgement] : insight and judgment were intact [Normal Affect] : the affect was normal [de-identified] : enlarged thyroid [de-identified] : good ROM of L knee but pain on extension.  pain just below L patella.  no swelling. MS 5/5.  no L ankle swelling. pain ant L ankle.  good ROM of L ankle

## 2020-07-08 NOTE — HISTORY OF PRESENT ILLNESS
[FreeTextEntry1] : CPE [de-identified] : vaccine- need 2nd vac\par diet- healthy\par exercise- now walking, wgt\par knee- since 6/15 after exercise-  worse w sitting, flexion. ice helps. constant.  pressure pain.  no taking NSAID b/c trying to get preg. \par ankle pain- L side 1 wk-no trauma]\par pt is getting evaluated by fertility and tomorrow has appt w endo

## 2020-07-08 NOTE — HEALTH RISK ASSESSMENT
[0] : 1) Little interest or pleasure doing things: Not at all (0) [Never (0 pts)] : Never (0 points) [No] : In the past 12 months have you used drugs other than those required for medical reasons? No [1] : 2) Feeling down, depressed, or hopeless for several days (1) [Hepatitis C test offered] : Hepatitis C test offered [HIV Test offered] : HIV Test offered [Patient reported mammogram was normal] : Patient reported mammogram was normal [Patient reported PAP Smear was normal] : Patient reported PAP Smear was normal [Name: ___] : Health Care Proxy's Name: [unfilled]  [With Patient/Caregiver] : With Patient/Caregiver [Relationship: ___] : Relationship: [unfilled] [] : No [Audit-CScore] : 0 [Reports changes in vision] : Reports no changes in vision [MammogramDate] : 5942 [MammogramComments] : as per pt.  pt is balta for MRI [PapSmearDate] : 06/20 [PapSmearComments] : as per pt.  breast exam done by GYN [de-identified] : seen ophth- 6/20 [de-identified] : seen dentist 05/20 [AdvancecareDate] : 06/20

## 2020-07-09 ENCOUNTER — APPOINTMENT (OUTPATIENT)
Dept: ENDOCRINOLOGY | Facility: CLINIC | Age: 35
End: 2020-07-09
Payer: COMMERCIAL

## 2020-07-09 VITALS
TEMPERATURE: 98.7 F | WEIGHT: 157 LBS | HEIGHT: 66 IN | SYSTOLIC BLOOD PRESSURE: 100 MMHG | DIASTOLIC BLOOD PRESSURE: 66 MMHG | BODY MASS INDEX: 25.23 KG/M2 | OXYGEN SATURATION: 98 % | HEART RATE: 71 BPM

## 2020-07-09 LAB
ALBUMIN SERPL ELPH-MCNC: 4.9 G/DL
ALP BLD-CCNC: 60 U/L
ALT SERPL-CCNC: 6 U/L
ANION GAP SERPL CALC-SCNC: 17 MMOL/L
AST SERPL-CCNC: 22 U/L
BASOPHILS # BLD AUTO: 0.03 K/UL
BASOPHILS NFR BLD AUTO: 0.6 %
BILIRUB SERPL-MCNC: 0.5 MG/DL
BUN SERPL-MCNC: 16 MG/DL
CALCIUM SERPL-MCNC: 10 MG/DL
CHLORIDE SERPL-SCNC: 100 MMOL/L
CHOLEST SERPL-MCNC: 190 MG/DL
CHOLEST/HDLC SERPL: 3.8 RATIO
CO2 SERPL-SCNC: 23 MMOL/L
CREAT SERPL-MCNC: 0.66 MG/DL
EOSINOPHIL # BLD AUTO: 0.11 K/UL
EOSINOPHIL NFR BLD AUTO: 2 %
GLUCOSE SERPL-MCNC: 96 MG/DL
HCT VFR BLD CALC: 45.5 %
HCV AB SER QL: NONREACTIVE
HCV S/CO RATIO: 0.18 S/CO
HDLC SERPL-MCNC: 50 MG/DL
HGB BLD-MCNC: 14.1 G/DL
HIV1+2 AB SPEC QL IA.RAPID: NONREACTIVE
IMM GRANULOCYTES NFR BLD AUTO: 0.2 %
LDLC SERPL CALC-MCNC: 128 MG/DL
LYMPHOCYTES # BLD AUTO: 1.77 K/UL
LYMPHOCYTES NFR BLD AUTO: 32.6 %
MAN DIFF?: NORMAL
MCHC RBC-ENTMCNC: 27.1 PG
MCHC RBC-ENTMCNC: 31 GM/DL
MCV RBC AUTO: 87.3 FL
MONOCYTES # BLD AUTO: 0.44 K/UL
MONOCYTES NFR BLD AUTO: 8.1 %
NEUTROPHILS # BLD AUTO: 3.07 K/UL
NEUTROPHILS NFR BLD AUTO: 56.5 %
PLATELET # BLD AUTO: 383 K/UL
POTASSIUM SERPL-SCNC: 4.5 MMOL/L
PROT SERPL-MCNC: 7.7 G/DL
RBC # BLD: 5.21 M/UL
RBC # FLD: 12.3 %
SARS-COV-2 IGG SERPL IA-ACNC: 0.01 INDEX
SARS-COV-2 IGG SERPL QL IA: NEGATIVE
SAVE SPECIMEN: NORMAL
SODIUM SERPL-SCNC: 140 MMOL/L
TRIGL SERPL-MCNC: 62 MG/DL
WBC # FLD AUTO: 5.43 K/UL

## 2020-07-09 PROCEDURE — 99214 OFFICE O/P EST MOD 30 MIN: CPT

## 2020-07-09 NOTE — PHYSICAL EXAM
[Alert] : alert [Well Nourished] : well nourished [No Acute Distress] : no acute distress [Normal Sclera/Conjunctiva] : normal sclera/conjunctiva [Normal Hearing] : hearing was normal [No LAD] : no lymphadenopathy [No Respiratory Distress] : no respiratory distress [No Accessory Muscle Use] : no accessory muscle use [Clear to Auscultation] : lungs were clear to auscultation bilaterally [Normal S1, S2] : normal S1 and S2 [Normal Rate] : heart rate was normal [Regular Rhythm] : with a regular rhythm [No Edema] : no peripheral edema [Normal Bowel Sounds] : normal bowel sounds [Not Tender] : non-tender [Soft] : abdomen soft [No CVA Tenderness] : no ~M costovertebral angle tenderness [No Stigmata of Cushings Syndrome] : no stigmata of Cushings Syndrome [Normal Gait] : normal gait [No Involuntary Movements] : no involuntary movements were seen [No Tremors] : no tremors [Oriented x3] : oriented to person, place, and time [Normal Affect] : the affect was normal

## 2020-07-09 NOTE — ASSESSMENT
[FreeTextEntry1] : This is a 33 y.o. woman w/  herniated discs here for f/u pt. visit for thyroid nodule and elevated TSH.\par -Thyroid US done 12/20/18 showed dominant 2.8cm R sided nodule and 1.2cm L thyroid nodule.\par No risk factors for thyroid CA. Clinically and biochemically euthyroid. She is s/p FNA of b/l thyroid nodules. She is scheduled for repeat thyroid US next week, will f.u results.\par \par -Elevated TSH: Found by repro endo to have mildly elevated TSH. Will repeat along w/ thyroid antibodies.\par \par Will call pt. w/ results

## 2020-07-09 NOTE — HISTORY OF PRESENT ILLNESS
[FreeTextEntry1] : This is a 35 y.o. woman w/  herniated discs here for f/u  pt. visit for thyroid nodule.\par \par She saw PMD who felt something on thyroid exam and sent her for thyroid US which showed thyroid nodules. She had FNA's on b/l thyroid US 1/2019 which were benign.\par \par No change in neck size. No trouble swallowing or breathing. No voice changes. She was born in [place]. No time spent outside the country.  No voice changes. No obstructive changes. No RT to head/neck/chest area. No FH of thyroid CA.\par \par Mother had thyroid nodules. No Fh of autoimmune disease. \par \par She works as  an ADA.\par \par She has been trying to get pregnant for > 1 year. Was told TSH mildly elevated.\par \par She has been feeling overall ok w/o complaints. Weight has been stable. No c/o tremors or palpitations. No change in BM's. No hair loss or skin changes. No muscle cramping. No mood changes.\par \par

## 2020-07-10 ENCOUNTER — APPOINTMENT (OUTPATIENT)
Dept: ORTHOPEDIC SURGERY | Facility: CLINIC | Age: 35
End: 2020-07-10
Payer: COMMERCIAL

## 2020-07-10 VITALS — TEMPERATURE: 98.2 F

## 2020-07-10 VITALS
SYSTOLIC BLOOD PRESSURE: 100 MMHG | HEIGHT: 66 IN | BODY MASS INDEX: 25.23 KG/M2 | WEIGHT: 157 LBS | HEART RATE: 71 BPM | DIASTOLIC BLOOD PRESSURE: 66 MMHG

## 2020-07-10 DIAGNOSIS — M76.52 PATELLAR TENDINITIS, LEFT KNEE: ICD-10-CM

## 2020-07-10 LAB
T4 FREE SERPL-MCNC: 1.2 NG/DL
TSH SERPL-ACNC: 3.32 UIU/ML

## 2020-07-10 PROCEDURE — 99204 OFFICE O/P NEW MOD 45 MIN: CPT

## 2020-07-10 PROCEDURE — 73562 X-RAY EXAM OF KNEE 3: CPT | Mod: LT

## 2020-07-10 PROCEDURE — 73610 X-RAY EXAM OF ANKLE: CPT | Mod: LT

## 2020-07-14 LAB
T3 SERPL-MCNC: 87 NG/DL
T4 FREE SERPL-MCNC: 1 NG/DL
THYROGLOB AB SERPL-ACNC: <20 IU/ML
THYROPEROXIDASE AB SERPL IA-ACNC: 1349 IU/ML
TSH SERPL-ACNC: 2.91 UIU/ML

## 2020-07-21 ENCOUNTER — OUTPATIENT (OUTPATIENT)
Dept: OUTPATIENT SERVICES | Facility: HOSPITAL | Age: 35
LOS: 1 days | End: 2020-07-21

## 2020-07-21 DIAGNOSIS — Z80.3 FAMILY HISTORY OF MALIGNANT NEOPLASM OF BREAST: ICD-10-CM

## 2020-07-21 DIAGNOSIS — Z00.8 ENCOUNTER FOR OTHER GENERAL EXAMINATION: ICD-10-CM

## 2020-07-31 ENCOUNTER — APPOINTMENT (OUTPATIENT)
Dept: ULTRASOUND IMAGING | Facility: CLINIC | Age: 35
End: 2020-07-31
Payer: COMMERCIAL

## 2020-07-31 ENCOUNTER — OUTPATIENT (OUTPATIENT)
Dept: OUTPATIENT SERVICES | Facility: HOSPITAL | Age: 35
LOS: 1 days | End: 2020-07-31
Payer: COMMERCIAL

## 2020-07-31 ENCOUNTER — APPOINTMENT (OUTPATIENT)
Dept: MRI IMAGING | Facility: CLINIC | Age: 35
End: 2020-07-31
Payer: COMMERCIAL

## 2020-07-31 DIAGNOSIS — E04.1 NONTOXIC SINGLE THYROID NODULE: ICD-10-CM

## 2020-07-31 PROCEDURE — 76536 US EXAM OF HEAD AND NECK: CPT | Mod: 26

## 2020-07-31 PROCEDURE — A9585: CPT

## 2020-07-31 PROCEDURE — C8908: CPT

## 2020-07-31 PROCEDURE — 77049 MRI BREAST C-+ W/CAD BI: CPT | Mod: 26

## 2020-07-31 PROCEDURE — C8937: CPT

## 2020-07-31 PROCEDURE — 76536 US EXAM OF HEAD AND NECK: CPT

## 2020-08-10 ENCOUNTER — APPOINTMENT (OUTPATIENT)
Dept: MRI IMAGING | Facility: IMAGING CENTER | Age: 35
End: 2020-08-10

## 2020-08-14 ENCOUNTER — APPOINTMENT (OUTPATIENT)
Dept: MRI IMAGING | Facility: IMAGING CENTER | Age: 35
End: 2020-08-14
Payer: COMMERCIAL

## 2020-08-14 ENCOUNTER — RESULT REVIEW (OUTPATIENT)
Age: 35
End: 2020-08-14

## 2020-08-14 ENCOUNTER — OUTPATIENT (OUTPATIENT)
Dept: OUTPATIENT SERVICES | Facility: HOSPITAL | Age: 35
LOS: 1 days | End: 2020-08-14
Payer: COMMERCIAL

## 2020-08-14 DIAGNOSIS — R92.8 OTHER ABNORMAL AND INCONCLUSIVE FINDINGS ON DIAGNOSTIC IMAGING OF BREAST: ICD-10-CM

## 2020-08-14 PROCEDURE — 88305 TISSUE EXAM BY PATHOLOGIST: CPT | Mod: 26

## 2020-08-14 PROCEDURE — 19085 BX BREAST 1ST LESION MR IMAG: CPT | Mod: RT

## 2020-08-14 PROCEDURE — 19085 BX BREAST 1ST LESION MR IMAG: CPT

## 2020-08-14 PROCEDURE — 77065 DX MAMMO INCL CAD UNI: CPT

## 2020-08-14 PROCEDURE — A9585: CPT

## 2020-08-14 PROCEDURE — 88305 TISSUE EXAM BY PATHOLOGIST: CPT

## 2020-08-14 PROCEDURE — 77065 DX MAMMO INCL CAD UNI: CPT | Mod: 26,RT

## 2020-09-14 LAB
T4 FREE SERPL-MCNC: 1.1 NG/DL
TSH SERPL-ACNC: 1.38 UIU/ML

## 2021-01-19 ENCOUNTER — RESULT REVIEW (OUTPATIENT)
Age: 36
End: 2021-01-19

## 2021-01-19 ENCOUNTER — APPOINTMENT (OUTPATIENT)
Dept: OPHTHALMOLOGY | Facility: CLINIC | Age: 36
End: 2021-01-19

## 2021-01-19 ENCOUNTER — OUTPATIENT (OUTPATIENT)
Dept: OUTPATIENT SERVICES | Facility: HOSPITAL | Age: 36
LOS: 1 days | End: 2021-01-19
Payer: COMMERCIAL

## 2021-01-19 ENCOUNTER — APPOINTMENT (OUTPATIENT)
Dept: ULTRASOUND IMAGING | Facility: IMAGING CENTER | Age: 36
End: 2021-01-19
Payer: COMMERCIAL

## 2021-01-19 DIAGNOSIS — E04.1 NONTOXIC SINGLE THYROID NODULE: ICD-10-CM

## 2021-01-19 PROCEDURE — 76536 US EXAM OF HEAD AND NECK: CPT | Mod: 26

## 2021-01-19 PROCEDURE — 76536 US EXAM OF HEAD AND NECK: CPT

## 2021-01-21 ENCOUNTER — TRANSCRIPTION ENCOUNTER (OUTPATIENT)
Age: 36
End: 2021-01-21

## 2021-01-22 ENCOUNTER — TRANSCRIPTION ENCOUNTER (OUTPATIENT)
Age: 36
End: 2021-01-22

## 2021-01-26 ENCOUNTER — NON-APPOINTMENT (OUTPATIENT)
Age: 36
End: 2021-01-26

## 2021-03-04 ENCOUNTER — APPOINTMENT (OUTPATIENT)
Dept: ENDOCRINOLOGY | Facility: CLINIC | Age: 36
End: 2021-03-04
Payer: COMMERCIAL

## 2021-03-04 VITALS
DIASTOLIC BLOOD PRESSURE: 70 MMHG | OXYGEN SATURATION: 98 % | HEIGHT: 66 IN | SYSTOLIC BLOOD PRESSURE: 100 MMHG | WEIGHT: 160 LBS | TEMPERATURE: 98.7 F | HEART RATE: 76 BPM | BODY MASS INDEX: 25.71 KG/M2

## 2021-03-04 PROCEDURE — 99072 ADDL SUPL MATRL&STAF TM PHE: CPT

## 2021-03-04 PROCEDURE — 99214 OFFICE O/P EST MOD 30 MIN: CPT | Mod: 25

## 2021-03-04 PROCEDURE — 10006 FNA BX W/US GDN EA ADDL: CPT

## 2021-03-04 PROCEDURE — 10005 FNA BX W/US GDN 1ST LES: CPT

## 2021-03-04 NOTE — ASSESSMENT
[FreeTextEntry1] : 35 y.o. woman w/  herniated discs here for f/u pt. visit for thyroid nodule and elevated TSH.\par \par #Thyroid US done 12/20/18 showed dominant 2.8 cm R sided nodule and 1.2 cm L thyroid nodule.\par No risk factors for thyroid CA. Clinically and biochemically euthyroid. She is s/p FNA of b/l thyroid nodules. She is\par Today had FNA of b/l nodules for increase in volume >50% and left nodule had suspicious features of Taller than wide and thick halo. \par -will call patient with results \par \par #Elevated TSH: Found by repro endo to have mildly elevated TSH and TPO + \par - continue LT4 50 mcg qday \par \par \par \par

## 2021-03-04 NOTE — PROCEDURE
[Fine Needle Aspiration] : Fine needle aspiration ~T ~C was performed. [Area of Mass: ______] : mass identified in the [unfilled] [Risks] : risks [Benefits] : benefits [Consent Obtained] : Written consent was obtained prior to the procedure and is detailed in the patient's record [Patient] : the patient [Supine] : The patient was placed in the supine position with the neck extended as tolerated. [Alcohol] : with alcohol [27 gauge 1.5 inch] : A 27 gauge 1.5 inch needle was used [____ Passes] : [unfilled] passes were made through the mass [Ultrasonic Guidance] : ultrasound guidance was employed [Sent to Histology] : The specimens were prepared in the usual manner and sent to histology. [Thyroseq] : Thyroseq [Tolerated Well] : the patient tolerated the procedure well [Vital Signs Stable] : the vital signs were stable [Hemostasis] : hemostasis was assured and the patient was discharged in satisfactory condition [No Complications] : There were no complications [Instructions Given] : Handouts/patient instructions were given to patient [___ Week(s)] : in [unfilled] week(s). [de-identified] : Left 1.5 cm nodule and right 4.2 cm nodule  [FreeTextEntry1] : Bilateral LN FNA benign in the past but now with >50% increase in volume bilaterally, left nodule also has features of taller than wide and thick halo. No complications, will call with results.

## 2021-03-04 NOTE — PHYSICAL EXAM
[Alert] : alert [Well Nourished] : well nourished [No Acute Distress] : no acute distress [Normal Sclera/Conjunctiva] : normal sclera/conjunctiva [Normal Hearing] : hearing was normal [No LAD] : no lymphadenopathy [No Respiratory Distress] : no respiratory distress [No Accessory Muscle Use] : no accessory muscle use [No Edema] : no peripheral edema [No Stigmata of Cushings Syndrome] : no stigmata of Cushings Syndrome [Normal Gait] : normal gait [No Involuntary Movements] : no involuntary movements were seen [No Tremors] : no tremors [Oriented x3] : oriented to person, place, and time [Normal Affect] : the affect was normal

## 2021-03-04 NOTE — HISTORY OF PRESENT ILLNESS
[FreeTextEntry1] : 35 y.o. woman w/  herniated discs here for f/u  pt. visit for thyroid nodule.\par \par She saw PMD who felt something on thyroid exam and sent her for thyroid US which showed thyroid nodules. She had FNA's on b/l thyroid US 1/2019 which were benign.\par \par No change in neck size. No trouble swallowing or breathing. No voice changes. She was born in [place]. No time spent outside the country.  No voice changes. No obstructive changes. No RT to head/neck/chest area. No FH of thyroid CA.\par \par Mother had thyroid nodules. No Fh of autoimmune disease. \par \par She works as  an ADA.\par \par She has been trying to get pregnant for > 1 year. Was told TSH mildly elevated. Last TSH was 1.38 on LT4 50 mcg. \par \par She has been feeling overall ok w/o complaints. Weight has been stable. No c/o tremors or palpitations. No change in BM's. No hair loss or skin changes. No muscle cramping. No mood changes.\par \par

## 2021-03-04 NOTE — REASON FOR VISIT
[Procedure: _________] : a [unfilled] procedure visit [Follow - Up] : a follow-up visit [Thyroid Biopsy] : a thyroid biopsy

## 2021-03-04 NOTE — PROCEDURE
[Fine Needle Aspiration] : Fine needle aspiration ~T ~C was performed. [Area of Mass: ______] : mass identified in the [unfilled] [Risks] : risks [Benefits] : benefits [Consent Obtained] : Written consent was obtained prior to the procedure and is detailed in the patient's record [Patient] : the patient [Supine] : The patient was placed in the supine position with the neck extended as tolerated. [Alcohol] : with alcohol [27 gauge 1.5 inch] : A 27 gauge 1.5 inch needle was used [____ Passes] : [unfilled] passes were made through the mass [Ultrasonic Guidance] : ultrasound guidance was employed [Sent to Histology] : The specimens were prepared in the usual manner and sent to histology. [Thyroseq] : Thyroseq [Tolerated Well] : the patient tolerated the procedure well [Vital Signs Stable] : the vital signs were stable [Hemostasis] : hemostasis was assured and the patient was discharged in satisfactory condition [No Complications] : There were no complications [Instructions Given] : Handouts/patient instructions were given to patient [___ Week(s)] : in [unfilled] week(s). [de-identified] : Left 1.5 cm nodule and right 4.2 cm nodule  [FreeTextEntry1] : Bilateral LN FNA benign in the past but now with >50% increase in volume bilaterally, left nodule also has features of taller than wide and thick halo. No complications, will call with results.

## 2021-03-09 ENCOUNTER — NON-APPOINTMENT (OUTPATIENT)
Age: 36
End: 2021-03-09

## 2021-03-09 ENCOUNTER — APPOINTMENT (OUTPATIENT)
Dept: OPHTHALMOLOGY | Facility: CLINIC | Age: 36
End: 2021-03-09
Payer: COMMERCIAL

## 2021-03-09 PROCEDURE — 92014 COMPRE OPH EXAM EST PT 1/>: CPT

## 2021-03-09 PROCEDURE — 99072 ADDL SUPL MATRL&STAF TM PHE: CPT

## 2021-03-09 PROCEDURE — 92083 EXTENDED VISUAL FIELD XM: CPT

## 2021-03-09 PROCEDURE — 92133 CPTRZD OPH DX IMG PST SGM ON: CPT

## 2021-03-11 ENCOUNTER — TRANSCRIPTION ENCOUNTER (OUTPATIENT)
Age: 36
End: 2021-03-11

## 2021-03-11 LAB — FNA, THYROID: NORMAL

## 2021-04-06 ENCOUNTER — RX RENEWAL (OUTPATIENT)
Age: 36
End: 2021-04-06

## 2021-06-01 ENCOUNTER — RESULT REVIEW (OUTPATIENT)
Age: 36
End: 2021-06-01

## 2021-07-09 ENCOUNTER — NON-APPOINTMENT (OUTPATIENT)
Age: 36
End: 2021-07-09

## 2021-07-09 ENCOUNTER — APPOINTMENT (OUTPATIENT)
Dept: INTERNAL MEDICINE | Facility: CLINIC | Age: 36
End: 2021-07-09
Payer: COMMERCIAL

## 2021-07-09 VITALS
TEMPERATURE: 98 F | HEART RATE: 82 BPM | SYSTOLIC BLOOD PRESSURE: 100 MMHG | HEIGHT: 66 IN | WEIGHT: 160 LBS | BODY MASS INDEX: 25.71 KG/M2 | OXYGEN SATURATION: 99 % | DIASTOLIC BLOOD PRESSURE: 70 MMHG

## 2021-07-09 DIAGNOSIS — Z92.29 PERSONAL HISTORY OF OTHER DRUG THERAPY: ICD-10-CM

## 2021-07-09 DIAGNOSIS — M25.562 PAIN IN LEFT KNEE: ICD-10-CM

## 2021-07-09 DIAGNOSIS — M54.9 DORSALGIA, UNSPECIFIED: ICD-10-CM

## 2021-07-09 DIAGNOSIS — Z11.59 ENCOUNTER FOR SCREENING FOR OTHER VIRAL DISEASES: ICD-10-CM

## 2021-07-09 DIAGNOSIS — H40.003 PREGLAUCOMA, UNSPECIFIED, BILATERAL: ICD-10-CM

## 2021-07-09 DIAGNOSIS — Z87.898 PERSONAL HISTORY OF OTHER SPECIFIED CONDITIONS: ICD-10-CM

## 2021-07-09 DIAGNOSIS — R79.89 OTHER SPECIFIED ABNORMAL FINDINGS OF BLOOD CHEMISTRY: ICD-10-CM

## 2021-07-09 DIAGNOSIS — M25.572 PAIN IN LEFT ANKLE AND JOINTS OF LEFT FOOT: ICD-10-CM

## 2021-07-09 DIAGNOSIS — G89.29 DORSALGIA, UNSPECIFIED: ICD-10-CM

## 2021-07-09 LAB
ALBUMIN SERPL ELPH-MCNC: 4.5 G/DL
ALP BLD-CCNC: 51 U/L
ALT SERPL-CCNC: 9 U/L
ANION GAP SERPL CALC-SCNC: 10 MMOL/L
AST SERPL-CCNC: 19 U/L
BASOPHILS # BLD AUTO: 0.06 K/UL
BASOPHILS NFR BLD AUTO: 1 %
BILIRUB SERPL-MCNC: 0.4 MG/DL
BUN SERPL-MCNC: 10 MG/DL
CALCIUM SERPL-MCNC: 9.9 MG/DL
CHLORIDE SERPL-SCNC: 103 MMOL/L
CHOLEST SERPL-MCNC: 161 MG/DL
CO2 SERPL-SCNC: 26 MMOL/L
CREAT SERPL-MCNC: 0.65 MG/DL
EOSINOPHIL # BLD AUTO: 0.08 K/UL
EOSINOPHIL NFR BLD AUTO: 1.3 %
GLUCOSE SERPL-MCNC: 99 MG/DL
HCT VFR BLD CALC: 44.3 %
HDLC SERPL-MCNC: 53 MG/DL
HGB BLD-MCNC: 14 G/DL
IMM GRANULOCYTES NFR BLD AUTO: 0.3 %
LDLC SERPL CALC-MCNC: 95 MG/DL
LDLC SERPL DIRECT ASSAY-MCNC: 93 MG/DL
LYMPHOCYTES # BLD AUTO: 1.53 K/UL
LYMPHOCYTES NFR BLD AUTO: 24.8 %
MAN DIFF?: NORMAL
MCHC RBC-ENTMCNC: 27 PG
MCHC RBC-ENTMCNC: 31.6 GM/DL
MCV RBC AUTO: 85.5 FL
MONOCYTES # BLD AUTO: 0.44 K/UL
MONOCYTES NFR BLD AUTO: 7.1 %
NEUTROPHILS # BLD AUTO: 4.03 K/UL
NEUTROPHILS NFR BLD AUTO: 65.5 %
NONHDLC SERPL-MCNC: 108 MG/DL
PLATELET # BLD AUTO: 329 K/UL
POTASSIUM SERPL-SCNC: 4.5 MMOL/L
PROT SERPL-MCNC: 7.4 G/DL
RBC # BLD: 5.18 M/UL
RBC # FLD: 11.9 %
SODIUM SERPL-SCNC: 139 MMOL/L
T4 FREE SERPL-MCNC: 1.4 NG/DL
TRIGL SERPL-MCNC: 66 MG/DL
TSH SERPL-ACNC: 1.78 UIU/ML
WBC # FLD AUTO: 6.16 K/UL

## 2021-07-09 PROCEDURE — 93000 ELECTROCARDIOGRAM COMPLETE: CPT

## 2021-07-09 PROCEDURE — 99072 ADDL SUPL MATRL&STAF TM PHE: CPT

## 2021-07-09 PROCEDURE — 99395 PREV VISIT EST AGE 18-39: CPT | Mod: 25

## 2021-07-09 PROCEDURE — 36415 COLL VENOUS BLD VENIPUNCTURE: CPT

## 2021-07-09 NOTE — HEALTH RISK ASSESSMENT
[Never (0 pts)] : Never (0 points) [No] : In the past 12 months have you used drugs other than those required for medical reasons? No [Patient reported mammogram was normal] : Patient reported mammogram was normal [Patient reported PAP Smear was normal] : Patient reported PAP Smear was normal [HIV Test offered] : HIV Test offered [Hepatitis C test offered] : Hepatitis C test offered [0] : 2) Feeling down, depressed, or hopeless: Not at all (0) [With Patient/Caregiver] : , with patient/caregiver [Designated Healthcare Proxy] : Designated healthcare proxy [Relationship: ___] : Relationship: [unfilled] [] : No [Audit-CScore] : 0 [Reports changes in vision] : Reports no changes in vision [MammogramDate] : 06/20 [MammogramComments] : had mastectomy [PapSmearDate] : 06/21 [PapSmearComments] : as per pt.   [de-identified] : seen ophth- 2021 [de-identified] : seen dentist 2021 [AdvancecareDate] : 07/21

## 2021-07-09 NOTE — PHYSICAL EXAM
[No Acute Distress] : no acute distress [Well Nourished] : well nourished [Well Developed] : well developed [Well-Appearing] : well-appearing [Normal Sclera/Conjunctiva] : normal sclera/conjunctiva [PERRL] : pupils equal round and reactive to light [Normal Outer Ear/Nose] : the outer ears and nose were normal in appearance [Normal TMs] : both tympanic membranes were normal [No Lymphadenopathy] : no lymphadenopathy [Supple] : supple [No Respiratory Distress] : no respiratory distress  [No Accessory Muscle Use] : no accessory muscle use [Clear to Auscultation] : lungs were clear to auscultation bilaterally [Normal Rate] : normal rate  [Regular Rhythm] : with a regular rhythm [Normal S1, S2] : normal S1 and S2 [No Murmur] : no murmur heard [No Edema] : there was no peripheral edema [Soft] : abdomen soft [Non Tender] : non-tender [Non-distended] : non-distended [No Masses] : no abdominal mass palpated [Normal Bowel Sounds] : normal bowel sounds [Normal Supraclavicular Nodes] : no supraclavicular lymphadenopathy [Normal Axillary Nodes] : no axillary lymphadenopathy [Normal Posterior Cervical Nodes] : no posterior cervical lymphadenopathy [Normal Anterior Cervical Nodes] : no anterior cervical lymphadenopathy [Normal Inguinal Nodes] : no inguinal lymphadenopathy [Grossly Normal Strength/Tone] : grossly normal strength/tone [No Focal Deficits] : no focal deficits [Normal Gait] : normal gait [Normal Affect] : the affect was normal [Normal Insight/Judgement] : insight and judgment were intact [de-identified] : thyroid enlarged

## 2021-07-09 NOTE — HISTORY OF PRESENT ILLNESS
[FreeTextEntry1] : CPE [de-identified] : vaccine- got COVID vac\par diet- healthy, WW\par exercise- no since mastectomy\par \par pt was evaluated by fertility- restarting fertility tx now , endo- being f/u by Dr. Palmer\par 1/19/21- US thyroid- enlg nodules.  3/11/21- bx- benign FNA.  4/19 ECHO EF 55%, mild TR\par chronic back pain- no chg- did PT

## 2021-07-12 LAB
HCV AB SER QL: NONREACTIVE
HCV S/CO RATIO: 0.19 S/CO
HIV1+2 AB SPEC QL IA.RAPID: NONREACTIVE

## 2021-07-20 ENCOUNTER — APPOINTMENT (OUTPATIENT)
Dept: ULTRASOUND IMAGING | Facility: IMAGING CENTER | Age: 36
End: 2021-07-20
Payer: COMMERCIAL

## 2021-07-20 ENCOUNTER — OUTPATIENT (OUTPATIENT)
Dept: OUTPATIENT SERVICES | Facility: HOSPITAL | Age: 36
LOS: 1 days | End: 2021-07-20
Payer: COMMERCIAL

## 2021-07-20 DIAGNOSIS — Z00.8 ENCOUNTER FOR OTHER GENERAL EXAMINATION: ICD-10-CM

## 2021-07-20 PROCEDURE — 76642 ULTRASOUND BREAST LIMITED: CPT

## 2021-07-20 PROCEDURE — 76642 ULTRASOUND BREAST LIMITED: CPT | Mod: 26,LT

## 2021-08-10 ENCOUNTER — APPOINTMENT (OUTPATIENT)
Dept: MRI IMAGING | Facility: IMAGING CENTER | Age: 36
End: 2021-08-10
Payer: COMMERCIAL

## 2021-08-10 ENCOUNTER — OUTPATIENT (OUTPATIENT)
Dept: OUTPATIENT SERVICES | Facility: HOSPITAL | Age: 36
LOS: 1 days | End: 2021-08-10
Payer: COMMERCIAL

## 2021-08-10 DIAGNOSIS — Z86.018 PERSONAL HISTORY OF OTHER BENIGN NEOPLASM: ICD-10-CM

## 2021-08-10 DIAGNOSIS — Z00.8 ENCOUNTER FOR OTHER GENERAL EXAMINATION: ICD-10-CM

## 2021-08-10 PROCEDURE — 77049 MRI BREAST C-+ W/CAD BI: CPT | Mod: 26

## 2021-08-10 PROCEDURE — A9585: CPT

## 2021-08-10 PROCEDURE — C8937: CPT

## 2021-08-10 PROCEDURE — C8908: CPT

## 2021-09-09 ENCOUNTER — APPOINTMENT (OUTPATIENT)
Dept: OPHTHALMOLOGY | Facility: CLINIC | Age: 36
End: 2021-09-09
Payer: COMMERCIAL

## 2021-09-09 ENCOUNTER — NON-APPOINTMENT (OUTPATIENT)
Age: 36
End: 2021-09-09

## 2021-09-09 PROCEDURE — 92133 CPTRZD OPH DX IMG PST SGM ON: CPT

## 2021-09-09 PROCEDURE — 92012 INTRM OPH EXAM EST PATIENT: CPT

## 2021-09-24 ENCOUNTER — APPOINTMENT (OUTPATIENT)
Dept: ENDOCRINOLOGY | Facility: CLINIC | Age: 36
End: 2021-09-24

## 2021-11-22 ENCOUNTER — APPOINTMENT (OUTPATIENT)
Dept: OPHTHALMOLOGY | Facility: CLINIC | Age: 36
End: 2021-11-22
Payer: COMMERCIAL

## 2021-11-22 ENCOUNTER — NON-APPOINTMENT (OUTPATIENT)
Age: 36
End: 2021-11-22

## 2021-11-22 PROCEDURE — 92012 INTRM OPH EXAM EST PATIENT: CPT

## 2021-11-24 ENCOUNTER — NON-APPOINTMENT (OUTPATIENT)
Age: 36
End: 2021-11-24

## 2021-11-24 ENCOUNTER — APPOINTMENT (OUTPATIENT)
Dept: OPHTHALMOLOGY | Facility: CLINIC | Age: 36
End: 2021-11-24
Payer: COMMERCIAL

## 2021-11-24 PROCEDURE — 92012 INTRM OPH EXAM EST PATIENT: CPT

## 2022-02-24 ENCOUNTER — OUTPATIENT (OUTPATIENT)
Dept: OUTPATIENT SERVICES | Facility: HOSPITAL | Age: 37
LOS: 1 days | End: 2022-02-24
Payer: COMMERCIAL

## 2022-02-24 ENCOUNTER — APPOINTMENT (OUTPATIENT)
Dept: ULTRASOUND IMAGING | Facility: IMAGING CENTER | Age: 37
End: 2022-02-24
Payer: COMMERCIAL

## 2022-02-24 DIAGNOSIS — Z00.8 ENCOUNTER FOR OTHER GENERAL EXAMINATION: ICD-10-CM

## 2022-02-24 PROCEDURE — 76536 US EXAM OF HEAD AND NECK: CPT

## 2022-02-24 PROCEDURE — 76536 US EXAM OF HEAD AND NECK: CPT | Mod: 26

## 2022-03-10 ENCOUNTER — NON-APPOINTMENT (OUTPATIENT)
Age: 37
End: 2022-03-10

## 2022-03-10 ENCOUNTER — APPOINTMENT (OUTPATIENT)
Dept: OPHTHALMOLOGY | Facility: CLINIC | Age: 37
End: 2022-03-10
Payer: COMMERCIAL

## 2022-03-10 PROCEDURE — 92014 COMPRE OPH EXAM EST PT 1/>: CPT

## 2022-03-10 PROCEDURE — 92133 CPTRZD OPH DX IMG PST SGM ON: CPT

## 2022-03-15 ENCOUNTER — OUTPATIENT (OUTPATIENT)
Dept: OUTPATIENT SERVICES | Facility: HOSPITAL | Age: 37
LOS: 1 days | End: 2022-03-15
Payer: COMMERCIAL

## 2022-03-15 ENCOUNTER — APPOINTMENT (OUTPATIENT)
Dept: ULTRASOUND IMAGING | Facility: IMAGING CENTER | Age: 37
End: 2022-03-15
Payer: COMMERCIAL

## 2022-03-15 ENCOUNTER — RESULT REVIEW (OUTPATIENT)
Age: 37
End: 2022-03-15

## 2022-03-15 DIAGNOSIS — E04.2 NONTOXIC MULTINODULAR GOITER: ICD-10-CM

## 2022-03-15 DIAGNOSIS — Z00.8 ENCOUNTER FOR OTHER GENERAL EXAMINATION: ICD-10-CM

## 2022-03-15 PROCEDURE — 10005 FNA BX W/US GDN 1ST LES: CPT

## 2022-03-15 PROCEDURE — 88173 CYTOPATH EVAL FNA REPORT: CPT | Mod: 26

## 2022-03-15 PROCEDURE — 88173 CYTOPATH EVAL FNA REPORT: CPT

## 2022-03-15 PROCEDURE — 10006 FNA BX W/US GDN EA ADDL: CPT | Mod: LT

## 2022-03-15 PROCEDURE — 10005 FNA BX W/US GDN 1ST LES: CPT | Mod: RT

## 2022-03-15 PROCEDURE — 88172 CYTP DX EVAL FNA 1ST EA SITE: CPT

## 2022-03-15 PROCEDURE — 10006 FNA BX W/US GDN EA ADDL: CPT

## 2022-03-18 LAB — NON-GYNECOLOGICAL CYTOLOGY STUDY: SIGNIFICANT CHANGE UP

## 2022-03-22 ENCOUNTER — NON-APPOINTMENT (OUTPATIENT)
Age: 37
End: 2022-03-22

## 2022-03-24 ENCOUNTER — NON-APPOINTMENT (OUTPATIENT)
Age: 37
End: 2022-03-24

## 2022-03-24 ENCOUNTER — APPOINTMENT (OUTPATIENT)
Dept: SURGERY | Facility: CLINIC | Age: 37
End: 2022-03-24
Payer: COMMERCIAL

## 2022-03-24 PROCEDURE — 99204 OFFICE O/P NEW MOD 45 MIN: CPT

## 2022-03-24 RX ORDER — ETONOGESTREL AND ETHINYL ESTRADIOL .12; .015 MG/D; MG/D
0.12-0.015 INSERT, EXTENDED RELEASE VAGINAL
Refills: 0 | Status: COMPLETED | COMMUNITY
End: 2022-03-24

## 2022-03-24 RX ORDER — LEVOTHYROXINE SODIUM 0.05 MG/1
50 TABLET ORAL DAILY
Qty: 90 | Refills: 0 | Status: COMPLETED | COMMUNITY
Start: 2020-07-14 | End: 2022-03-24

## 2022-03-25 VITALS
WEIGHT: 170 LBS | HEART RATE: 83 BPM | HEIGHT: 66 IN | BODY MASS INDEX: 27.32 KG/M2 | DIASTOLIC BLOOD PRESSURE: 75 MMHG | SYSTOLIC BLOOD PRESSURE: 113 MMHG

## 2022-03-25 NOTE — REASON FOR VISIT
[Initial Consultation] : an initial consultation for [Spouse] : spouse [FreeTextEntry2] : Thyroid malignancy

## 2022-03-25 NOTE — HISTORY OF PRESENT ILLNESS
[de-identified] : Pt c/o thyroid nodules.  denies dysphagia, hoarseness, SOB or RT exposure\par sonogram:  Right 4.9 cm,  FNA Benign, left 1.7 cm,  FNA benign, left mid 1.2 cm,  FNA positive for malignancy, Left lower 1.2 cm nodule,  FNA benign\par normal TFTs\par I have reviewed all old and new data and available images.  Additional information was obtained from others present at the time of visit to ensure the completeness of the history\par

## 2022-03-25 NOTE — ASSESSMENT
[FreeTextEntry1] : lengthy discussion regarding options for management including active surveillance  vs thyroid lobectomy with possible paratracheal node dissection, with or without frozen section vs total thyroidectomy with possible paratracheal node dissection. risks, benefits and alternatives discussed at length.  I have discussed with the patient the anatomy of the area, the pathophysiology of the disease process and the rationale for surgery.  The attendant risks, possible complications and expected postoperative course have been discussed in detail.  I have given the patient the opportunity to ask questions, and all questions have been answered to the patient's satisfaction, and they wish to proceed with the planned procedure.  to schedule total thyroidectomy ambulatory at [facility]

## 2022-03-25 NOTE — PHYSICAL EXAM
[de-identified] : 4  cm right and 2 cm left thyroid nodules, well circumscribed and mobile [Laryngoscopy Performed] : laryngoscopy was performed, see procedure section for findings [Midline] : located in midline position [Normal] : orientation to person, place, and time: normal [de-identified] : indirect  laryngoscopy shows normal vocal cord mobility bilaterally with no lesions noted

## 2022-03-25 NOTE — CONSULT LETTER
[Dear  ___] : Dear  [unfilled], [Consult Letter:] : I had the pleasure of evaluating your patient, [unfilled]. [Please see my note below.] : Please see my note below. [Consult Closing:] : Thank you very much for allowing me to participate in the care of this patient.  If you have any questions, please do not hesitate to contact me. [Sincerely,] : Sincerely, [FreeTextEntry2] : Dr. Yolanda Palmer, Dr. Yolanda Kaufman [FreeTextEntry3] : David Toney MD, FACS\par System Director, Endocrine Surgery\par [facility]\par Associate  Professor of Surgery\par [facility] [employer] [facility]  [DrMarshall  ___] : Dr. FROST

## 2022-03-29 ENCOUNTER — OUTPATIENT (OUTPATIENT)
Dept: OUTPATIENT SERVICES | Facility: HOSPITAL | Age: 37
LOS: 1 days | End: 2022-03-29

## 2022-03-29 VITALS
OXYGEN SATURATION: 98 % | DIASTOLIC BLOOD PRESSURE: 84 MMHG | HEART RATE: 75 BPM | TEMPERATURE: 98 F | SYSTOLIC BLOOD PRESSURE: 111 MMHG | WEIGHT: 175.93 LBS | HEIGHT: 66 IN | RESPIRATION RATE: 16 BRPM

## 2022-03-29 DIAGNOSIS — C73 MALIGNANT NEOPLASM OF THYROID GLAND: ICD-10-CM

## 2022-03-29 DIAGNOSIS — E03.9 HYPOTHYROIDISM, UNSPECIFIED: ICD-10-CM

## 2022-03-29 DIAGNOSIS — Z90.13 ACQUIRED ABSENCE OF BILATERAL BREASTS AND NIPPLES: Chronic | ICD-10-CM

## 2022-03-29 LAB
ALBUMIN SERPL ELPH-MCNC: 4.7 G/DL — SIGNIFICANT CHANGE UP (ref 3.3–5)
ALP SERPL-CCNC: 64 U/L — SIGNIFICANT CHANGE UP (ref 40–120)
ALT FLD-CCNC: 12 U/L — SIGNIFICANT CHANGE UP (ref 4–33)
ANION GAP SERPL CALC-SCNC: 13 MMOL/L — SIGNIFICANT CHANGE UP (ref 7–14)
AST SERPL-CCNC: 18 U/L — SIGNIFICANT CHANGE UP (ref 4–32)
BILIRUB SERPL-MCNC: <0.2 MG/DL — SIGNIFICANT CHANGE UP (ref 0.2–1.2)
BLD GP AB SCN SERPL QL: NEGATIVE — SIGNIFICANT CHANGE UP
BUN SERPL-MCNC: 17 MG/DL — SIGNIFICANT CHANGE UP (ref 7–23)
CALCIUM SERPL-MCNC: 9.8 MG/DL — SIGNIFICANT CHANGE UP (ref 8.4–10.5)
CHLORIDE SERPL-SCNC: 102 MMOL/L — SIGNIFICANT CHANGE UP (ref 98–107)
CO2 SERPL-SCNC: 24 MMOL/L — SIGNIFICANT CHANGE UP (ref 22–31)
CREAT SERPL-MCNC: 0.66 MG/DL — SIGNIFICANT CHANGE UP (ref 0.5–1.3)
EGFR: 117 ML/MIN/1.73M2 — SIGNIFICANT CHANGE UP
GLUCOSE SERPL-MCNC: 100 MG/DL — HIGH (ref 70–99)
HCG UR QL: NEGATIVE — SIGNIFICANT CHANGE UP
HCT VFR BLD CALC: 41 % — SIGNIFICANT CHANGE UP (ref 34.5–45)
HGB BLD-MCNC: 13.8 G/DL — SIGNIFICANT CHANGE UP (ref 11.5–15.5)
MCHC RBC-ENTMCNC: 27.7 PG — SIGNIFICANT CHANGE UP (ref 27–34)
MCHC RBC-ENTMCNC: 33.7 GM/DL — SIGNIFICANT CHANGE UP (ref 32–36)
MCV RBC AUTO: 82.3 FL — SIGNIFICANT CHANGE UP (ref 80–100)
NRBC # BLD: 0 /100 WBCS — SIGNIFICANT CHANGE UP
NRBC # FLD: 0 K/UL — SIGNIFICANT CHANGE UP
PLATELET # BLD AUTO: 370 K/UL — SIGNIFICANT CHANGE UP (ref 150–400)
POTASSIUM SERPL-MCNC: 3.8 MMOL/L — SIGNIFICANT CHANGE UP (ref 3.5–5.3)
POTASSIUM SERPL-SCNC: 3.8 MMOL/L — SIGNIFICANT CHANGE UP (ref 3.5–5.3)
PROT SERPL-MCNC: 7.6 G/DL — SIGNIFICANT CHANGE UP (ref 6–8.3)
RBC # BLD: 4.98 M/UL — SIGNIFICANT CHANGE UP (ref 3.8–5.2)
RBC # FLD: 12.4 % — SIGNIFICANT CHANGE UP (ref 10.3–14.5)
RH IG SCN BLD-IMP: POSITIVE — SIGNIFICANT CHANGE UP
SODIUM SERPL-SCNC: 139 MMOL/L — SIGNIFICANT CHANGE UP (ref 135–145)
WBC # BLD: 10.02 K/UL — SIGNIFICANT CHANGE UP (ref 3.8–10.5)
WBC # FLD AUTO: 10.02 K/UL — SIGNIFICANT CHANGE UP (ref 3.8–10.5)

## 2022-03-29 RX ORDER — SODIUM CHLORIDE 9 MG/ML
1000 INJECTION, SOLUTION INTRAVENOUS
Refills: 0 | Status: DISCONTINUED | OUTPATIENT
Start: 2022-04-04 | End: 2022-04-18

## 2022-03-29 NOTE — H&P PST ADULT - PROBLEM SELECTOR PLAN 1
Scheduled for total thyroidectomy , paratracheal node dissection   Preop instructions provided and patient verbalizes understanding.  Labs done and results pending.   Famotidine provided with instructions. Hibiclens provided with instructions and was signed by patient. Teach-back method was utilized to assess patient's understanding. Patient verbalized understanding.

## 2022-03-29 NOTE — H&P PST ADULT - HISTORY OF PRESENT ILLNESS
This is a 37 y.o. female th malignant neoplasm of thyroid gland . Pt had biopsy , sono . Pt is scheduled for surgery 4/4/22. Pt offers no complaints in pst .  This is a 37 y.o. female with malignant neoplasm of thyroid gland . Pt had biopsy , sono . Pt is scheduled for surgery 4/4/22. Pt offers no complaints in pst .

## 2022-04-01 NOTE — ASU PATIENT PROFILE, ADULT - FALL HARM RISK - UNIVERSAL INTERVENTIONS
Bed in lowest position, wheels locked, appropriate side rails in place/Call bell, personal items and telephone in reach/Instruct patient to call for assistance before getting out of bed or chair/Non-slip footwear when patient is out of bed/[place] to call system/Physically safe environment - no spills, clutter or unnecessary equipment/Purposeful Proactive Rounding/Room/bathroom lighting operational, light cord in reach

## 2022-04-01 NOTE — ASU PATIENT PROFILE, ADULT - MUTUALITY COMMENT, PROFILE
Discussed with Pt & her  their ability to have questions answered by dr christiansen & anesthesia before going into the OR

## 2022-04-03 ENCOUNTER — TRANSCRIPTION ENCOUNTER (OUTPATIENT)
Age: 37
End: 2022-04-03

## 2022-04-04 ENCOUNTER — RESULT REVIEW (OUTPATIENT)
Age: 37
End: 2022-04-04

## 2022-04-04 ENCOUNTER — OUTPATIENT (OUTPATIENT)
Dept: OUTPATIENT SERVICES | Facility: HOSPITAL | Age: 37
LOS: 1 days | Discharge: HOME | End: 2022-04-04
Payer: COMMERCIAL

## 2022-04-04 ENCOUNTER — APPOINTMENT (OUTPATIENT)
Dept: SURGERY | Facility: HOSPITAL | Age: 37
End: 2022-04-04

## 2022-04-04 ENCOUNTER — TRANSCRIPTION ENCOUNTER (OUTPATIENT)
Age: 37
End: 2022-04-04

## 2022-04-04 VITALS
HEART RATE: 67 BPM | SYSTOLIC BLOOD PRESSURE: 107 MMHG | HEIGHT: 66 IN | DIASTOLIC BLOOD PRESSURE: 76 MMHG | OXYGEN SATURATION: 100 % | WEIGHT: 175.93 LBS | TEMPERATURE: 99 F | RESPIRATION RATE: 14 BRPM

## 2022-04-04 VITALS
SYSTOLIC BLOOD PRESSURE: 117 MMHG | HEART RATE: 94 BPM | TEMPERATURE: 97 F | DIASTOLIC BLOOD PRESSURE: 78 MMHG | RESPIRATION RATE: 18 BRPM | OXYGEN SATURATION: 100 %

## 2022-04-04 DIAGNOSIS — Z90.13 ACQUIRED ABSENCE OF BILATERAL BREASTS AND NIPPLES: Chronic | ICD-10-CM

## 2022-04-04 DIAGNOSIS — C73 MALIGNANT NEOPLASM OF THYROID GLAND: ICD-10-CM

## 2022-04-04 LAB
CALCIUM SERPL-MCNC: 9.2 MG/DL — SIGNIFICANT CHANGE UP (ref 8.4–10.5)
CALCIUM SERPL-MCNC: 9.4 MG/DL — SIGNIFICANT CHANGE UP (ref 8.4–10.5)
HCG UR QL: NEGATIVE — SIGNIFICANT CHANGE UP

## 2022-04-04 PROCEDURE — 88307 TISSUE EXAM BY PATHOLOGIST: CPT | Mod: 26

## 2022-04-04 PROCEDURE — 60252 REMOVAL OF THYROID: CPT

## 2022-04-04 PROCEDURE — 13132 CMPLX RPR F/C/C/M/N/AX/G/H/F: CPT | Mod: 59

## 2022-04-04 DEVICE — LIGATING CLIPS WECK HORIZON MEDIUM (BLUE) 24: Type: IMPLANTABLE DEVICE | Status: FUNCTIONAL

## 2022-04-04 DEVICE — LIGATING CLIPS WECK HORIZON SMALL-WIDE (RED) 24: Type: IMPLANTABLE DEVICE | Status: FUNCTIONAL

## 2022-04-04 RX ORDER — OXYCODONE HYDROCHLORIDE 5 MG/1
5 TABLET ORAL ONCE
Refills: 0 | Status: DISCONTINUED | OUTPATIENT
Start: 2022-04-04 | End: 2022-04-04

## 2022-04-04 RX ORDER — CALCIUM GLUCONATE 100 MG/ML
1 VIAL (ML) INTRAVENOUS ONCE
Refills: 0 | Status: COMPLETED | OUTPATIENT
Start: 2022-04-04 | End: 2022-04-04

## 2022-04-04 RX ORDER — ACETAMINOPHEN 500 MG
2 TABLET ORAL
Qty: 0 | Refills: 0 | DISCHARGE
Start: 2022-04-04

## 2022-04-04 RX ORDER — FLUTICASONE PROPIONATE 50 MCG
2 SPRAY, SUSPENSION NASAL
Qty: 0 | Refills: 0 | DISCHARGE

## 2022-04-04 RX ORDER — OXYCODONE HYDROCHLORIDE 5 MG/1
10 TABLET ORAL ONCE
Refills: 0 | Status: DISCONTINUED | OUTPATIENT
Start: 2022-04-04 | End: 2022-04-04

## 2022-04-04 RX ORDER — FENTANYL CITRATE 50 UG/ML
25 INJECTION INTRAVENOUS
Refills: 0 | Status: DISCONTINUED | OUTPATIENT
Start: 2022-04-04 | End: 2022-04-04

## 2022-04-04 RX ORDER — CHOLECALCIFEROL (VITAMIN D3) 125 MCG
1 CAPSULE ORAL
Qty: 0 | Refills: 0 | DISCHARGE

## 2022-04-04 RX ORDER — ONDANSETRON 8 MG/1
4 TABLET, FILM COATED ORAL ONCE
Refills: 0 | Status: DISCONTINUED | OUTPATIENT
Start: 2022-04-04 | End: 2022-04-18

## 2022-04-04 RX ORDER — FENTANYL CITRATE 50 UG/ML
50 INJECTION INTRAVENOUS
Refills: 0 | Status: DISCONTINUED | OUTPATIENT
Start: 2022-04-04 | End: 2022-04-04

## 2022-04-04 RX ORDER — LEVOTHYROXINE SODIUM 125 MCG
1 TABLET ORAL
Qty: 0 | Refills: 0 | DISCHARGE

## 2022-04-04 RX ORDER — OXYCODONE AND ACETAMINOPHEN 5; 325 MG/1; MG/1
1 TABLET ORAL EVERY 6 HOURS
Refills: 0 | Status: DISCONTINUED | OUTPATIENT
Start: 2022-04-04 | End: 2022-04-04

## 2022-04-04 RX ORDER — BENZOCAINE AND MENTHOL 5; 1 G/100ML; G/100ML
1 LIQUID ORAL
Refills: 0 | Status: DISCONTINUED | OUTPATIENT
Start: 2022-04-04 | End: 2022-04-18

## 2022-04-04 RX ORDER — CALCIUM CARBONATE 500(1250)
1 TABLET ORAL EVERY 6 HOURS
Refills: 0 | Status: DISCONTINUED | OUTPATIENT
Start: 2022-04-04 | End: 2022-04-18

## 2022-04-04 RX ORDER — ACETAMINOPHEN 500 MG
650 TABLET ORAL EVERY 6 HOURS
Refills: 0 | Status: DISCONTINUED | OUTPATIENT
Start: 2022-04-04 | End: 2022-04-18

## 2022-04-04 RX ORDER — CALCIUM CARBONATE 500(1250)
1 TABLET ORAL
Qty: 0 | Refills: 0 | DISCHARGE
Start: 2022-04-04

## 2022-04-04 RX ADMIN — FENTANYL CITRATE 50 MICROGRAM(S): 50 INJECTION INTRAVENOUS at 13:45

## 2022-04-04 RX ADMIN — Medication 1 TABLET(S): at 13:35

## 2022-04-04 RX ADMIN — Medication 1 TABLET(S): at 19:12

## 2022-04-04 RX ADMIN — FENTANYL CITRATE 50 MICROGRAM(S): 50 INJECTION INTRAVENOUS at 13:35

## 2022-04-04 RX ADMIN — Medication 100 GRAM(S): at 13:34

## 2022-04-04 NOTE — ASU DISCHARGE PLAN (ADULT/PEDIATRIC) - NURSING INSTRUCTIONS
Call MD for any neck swelling, any shortness of breath, or any reddness/drainage from wound. Stay away from hot, spicy and jagged edged foods.  Call MD for any nasal tip, fingertip or extremity numbness/tingling.    DO NOT take any Tylenol (Acetaminophen) or narcotics containing Tylenol until after  __6pm____ . You received Tylenol during your operation and it can cause damage to your liver if too much is taken within a 24 hour time period.

## 2022-04-04 NOTE — ASU DISCHARGE PLAN (ADULT/PEDIATRIC) - NS MD DC FALL RISK RISK
For information on Fall & Injury Prevention, visit: [email or web address] OR  [email or web address] OR  https://www.cdc.gov/steadi/patient.html

## 2022-04-04 NOTE — ASU DISCHARGE PLAN (ADULT/PEDIATRIC) - CARE PROVIDER_API CALL
David Toney)  Plastic Surgery  [address] [place] [address] [address]  [place] [address]  Phone: [phone]  Fax: [phone]  Follow Up Time:

## 2022-04-04 NOTE — ASU DISCHARGE PLAN (ADULT/PEDIATRIC) - CALL YOUR DOCTOR IF YOU HAVE ANY OF THE FOLLOWING:
Bleeding that does not stop/Swelling that gets worse/Fever greater than (need to indicate Fahrenheit or Celsius)/Numbness, tingling, color or temperature change to extremity/Unable to urinate

## 2022-04-04 NOTE — ASU DISCHARGE PLAN (ADULT/PEDIATRIC) - OK TO LEAVE MESSAGE ON VOICEMAIL
Detail Level: Detailed Quality 265: Biopsy Follow-Up: Biopsy results reviewed, communicated, tracked, and documented Quality 226: Preventive Care And Screening: Tobacco Use: Screening And Cessation Intervention: Patient screened for tobacco and never smoked Yes

## 2022-04-05 ENCOUNTER — APPOINTMENT (OUTPATIENT)
Dept: SURGERY | Facility: CLINIC | Age: 37
End: 2022-04-05
Payer: COMMERCIAL

## 2022-04-05 PROBLEM — N60.99 UNSPECIFIED BENIGN MAMMARY DYSPLASIA OF UNSPECIFIED BREAST: Chronic | Status: ACTIVE | Noted: 2022-03-29

## 2022-04-05 PROBLEM — E03.9 HYPOTHYROIDISM, UNSPECIFIED: Chronic | Status: ACTIVE | Noted: 2022-03-29

## 2022-04-05 PROCEDURE — 99024 POSTOP FOLLOW-UP VISIT: CPT

## 2022-04-05 RX ORDER — ERYTHROMYCIN 5 MG/G
5 OINTMENT OPHTHALMIC
Qty: 4 | Refills: 0 | Status: COMPLETED | COMMUNITY
Start: 2021-11-22

## 2022-04-05 RX ORDER — MELOXICAM 15 MG/1
15 TABLET ORAL
Qty: 30 | Refills: 0 | Status: COMPLETED | COMMUNITY
Start: 2022-03-18

## 2022-04-05 RX ORDER — AMOXICILLIN AND CLAVULANATE POTASSIUM 500; 125 MG/1; MG/1
500-125 TABLET, FILM COATED ORAL
Qty: 14 | Refills: 0 | Status: COMPLETED | COMMUNITY
Start: 2022-02-18

## 2022-04-05 NOTE — PHYSICAL EXAM
[de-identified] : well healed scar [Midline] : located in midline position [Normal] : orientation to person, place, and time: normal

## 2022-04-05 NOTE — ASSESSMENT
[FreeTextEntry1] : s/p thyroidectomy drain removed\par call for path \par daily care\par f/u 6 weeks\par f/u Dr horner as scheduled

## 2022-04-05 NOTE — HISTORY OF PRESENT ILLNESS
[de-identified] : Pt 1 day s/p total thyroidectomy doing well without complaints Pt spoke to Dr Hammond and has increased her Synthroid to 125 mcg daily

## 2022-04-11 PROBLEM — Z11.59 SCREENING FOR VIRAL DISEASE: Status: RESOLVED | Noted: 2020-07-08 | Resolved: 2021-07-09

## 2022-04-15 LAB — SURGICAL PATHOLOGY STUDY: SIGNIFICANT CHANGE UP

## 2022-04-18 ENCOUNTER — NON-APPOINTMENT (OUTPATIENT)
Age: 37
End: 2022-04-18

## 2022-05-12 ENCOUNTER — APPOINTMENT (OUTPATIENT)
Dept: INTERNAL MEDICINE | Facility: CLINIC | Age: 37
End: 2022-05-12
Payer: COMMERCIAL

## 2022-05-12 ENCOUNTER — NON-APPOINTMENT (OUTPATIENT)
Age: 37
End: 2022-05-12

## 2022-05-12 VITALS
HEIGHT: 66 IN | WEIGHT: 172 LBS | DIASTOLIC BLOOD PRESSURE: 70 MMHG | HEART RATE: 83 BPM | SYSTOLIC BLOOD PRESSURE: 120 MMHG | RESPIRATION RATE: 16 BRPM | BODY MASS INDEX: 27.64 KG/M2 | OXYGEN SATURATION: 99 % | TEMPERATURE: 98.3 F

## 2022-05-12 DIAGNOSIS — K59.00 CONSTIPATION, UNSPECIFIED: ICD-10-CM

## 2022-05-12 PROCEDURE — 99213 OFFICE O/P EST LOW 20 MIN: CPT

## 2022-05-12 NOTE — PHYSICAL EXAM
[No Acute Distress] : no acute distress [Well Nourished] : well nourished [Well Developed] : well developed [Soft] : abdomen soft [Non Tender] : non-tender [Non-distended] : non-distended

## 2022-05-12 NOTE — HISTORY OF PRESENT ILLNESS
[FreeTextEntry8] : Pt reports that needs medical clearance for IVF.\par I have spoken to presurgical coordinator who said pt needs medical clearance from endocrinologist who did thyroidectomy on 4/4/22.\par Also I have spoken to Dr.Wasserman rosas who agreed to clear pt for IVF.\par \par Pt reports feeling well except constipation started after thyroid surgery.

## 2022-05-17 ENCOUNTER — APPOINTMENT (OUTPATIENT)
Dept: SURGERY | Facility: CLINIC | Age: 37
End: 2022-05-17
Payer: COMMERCIAL

## 2022-05-17 ENCOUNTER — APPOINTMENT (OUTPATIENT)
Dept: ULTRASOUND IMAGING | Facility: IMAGING CENTER | Age: 37
End: 2022-05-17
Payer: COMMERCIAL

## 2022-05-17 ENCOUNTER — OUTPATIENT (OUTPATIENT)
Dept: OUTPATIENT SERVICES | Facility: HOSPITAL | Age: 37
LOS: 1 days | End: 2022-05-17
Payer: COMMERCIAL

## 2022-05-17 DIAGNOSIS — Z00.8 ENCOUNTER FOR OTHER GENERAL EXAMINATION: ICD-10-CM

## 2022-05-17 DIAGNOSIS — Z90.13 ACQUIRED ABSENCE OF BILATERAL BREASTS AND NIPPLES: Chronic | ICD-10-CM

## 2022-05-17 PROCEDURE — 99024 POSTOP FOLLOW-UP VISIT: CPT

## 2022-05-17 PROCEDURE — 76770 US EXAM ABDO BACK WALL COMP: CPT | Mod: 26

## 2022-05-17 PROCEDURE — 76770 US EXAM ABDO BACK WALL COMP: CPT

## 2022-05-17 NOTE — HISTORY OF PRESENT ILLNESS
[de-identified] : Pt 6 weeks s/p thyroidectomy for malignancy doing well on current dose of synthroid from Dr Palmer Pt undergoing IVF will do egg retrieval and then thyrogen scan

## 2022-05-17 NOTE — ASSESSMENT
[FreeTextEntry1] : s/p Thyroidectomy\par labs and scans per Dr Palmer\par daily care\par f/u 4 months

## 2022-07-15 ENCOUNTER — APPOINTMENT (OUTPATIENT)
Dept: INTERNAL MEDICINE | Facility: CLINIC | Age: 37
End: 2022-07-15

## 2022-07-15 VITALS — WEIGHT: 175 LBS | HEIGHT: 66 IN | BODY MASS INDEX: 28.12 KG/M2

## 2022-07-15 VITALS — SYSTOLIC BLOOD PRESSURE: 110 MMHG | DIASTOLIC BLOOD PRESSURE: 80 MMHG

## 2022-07-15 DIAGNOSIS — Z11.4 ENCOUNTER FOR SCREENING FOR HUMAN IMMUNODEFICIENCY VIRUS [HIV]: ICD-10-CM

## 2022-07-15 DIAGNOSIS — Z11.59 ENCOUNTER FOR SCREENING FOR OTHER VIRAL DISEASES: ICD-10-CM

## 2022-07-15 PROCEDURE — 99395 PREV VISIT EST AGE 18-39: CPT | Mod: 25

## 2022-07-15 PROCEDURE — 36415 COLL VENOUS BLD VENIPUNCTURE: CPT

## 2022-07-15 RX ORDER — LEVOTHYROXINE SODIUM 75 UG/1
75 TABLET ORAL
Refills: 0 | Status: DISCONTINUED | COMMUNITY
End: 2022-07-15

## 2022-07-15 RX ORDER — LEVOTHYROXINE SODIUM 125 UG/1
125 TABLET ORAL
Refills: 0 | Status: ACTIVE | COMMUNITY
Start: 2022-03-31

## 2022-07-15 NOTE — PHYSICAL EXAM
[No Acute Distress] : no acute distress [Well Nourished] : well nourished [Well Developed] : well developed [Well-Appearing] : well-appearing [Normal Sclera/Conjunctiva] : normal sclera/conjunctiva [PERRL] : pupils equal round and reactive to light [Normal Outer Ear/Nose] : the outer ears and nose were normal in appearance [Normal TMs] : both tympanic membranes were normal [No Lymphadenopathy] : no lymphadenopathy [Supple] : supple [No Respiratory Distress] : no respiratory distress  [No Accessory Muscle Use] : no accessory muscle use [Clear to Auscultation] : lungs were clear to auscultation bilaterally [Normal Rate] : normal rate  [Regular Rhythm] : with a regular rhythm [Normal S1, S2] : normal S1 and S2 [No Murmur] : no murmur heard [No Edema] : there was no peripheral edema [Soft] : abdomen soft [Non Tender] : non-tender [Non-distended] : non-distended [No Masses] : no abdominal mass palpated [Normal Bowel Sounds] : normal bowel sounds [Normal Supraclavicular Nodes] : no supraclavicular lymphadenopathy [Normal Axillary Nodes] : no axillary lymphadenopathy [Normal Posterior Cervical Nodes] : no posterior cervical lymphadenopathy [Normal Anterior Cervical Nodes] : no anterior cervical lymphadenopathy [Normal Inguinal Nodes] : no inguinal lymphadenopathy [Grossly Normal Strength/Tone] : grossly normal strength/tone [No Focal Deficits] : no focal deficits [Normal Gait] : normal gait [Normal Affect] : the affect was normal [Normal Insight/Judgement] : insight and judgment were intact [de-identified] : thyroid enlarged

## 2022-07-15 NOTE — HEALTH RISK ASSESSMENT
[Never (0 pts)] : Never (0 points) [No] : In the past 12 months have you used drugs other than those required for medical reasons? No [0] : 2) Feeling down, depressed, or hopeless: Not at all (0) [Patient reported mammogram was normal] : Patient reported mammogram was normal [Patient reported PAP Smear was normal] : Patient reported PAP Smear was normal [Hepatitis C test offered] : Hepatitis C test offered [With Patient/Caregiver] : , with patient/caregiver [Designated Healthcare Proxy] : Designated healthcare proxy [Relationship: ___] : Relationship: [unfilled] [Never] : Never [Yes] : Yes [2 - 4 times a month (2 pts)] : 2-4 times a month (2 points) [1 or 2 (0 pts)] : 1 or 2 (0 points) [PHQ-2 Negative - No further assessment needed] : PHQ-2 Negative - No further assessment needed [HIV Test offered] : HIV Test offered [Audit-CScore] : 1 [[id number]] : 0 [Reports changes in vision] : Reports no changes in vision [MammogramDate] : 06/20 [MammogramComments] : had mastectomy [PapSmearDate] : 06/21 [PapSmearComments] : as per pt.  pt will f/u w GYN.  had double masectomy [de-identified] : seen ophth- 3/2022 [de-identified] : seen dentist 05/22 [AdvancecareDate] : 07/21

## 2022-07-16 LAB
ALBUMIN SERPL ELPH-MCNC: 4.7 G/DL
ALP BLD-CCNC: 50 U/L
ALT SERPL-CCNC: 8 U/L
ANION GAP SERPL CALC-SCNC: 13 MMOL/L
AST SERPL-CCNC: 22 U/L
BASOPHILS # BLD AUTO: 0.04 K/UL
BASOPHILS NFR BLD AUTO: 0.7 %
BILIRUB SERPL-MCNC: 0.3 MG/DL
BUN SERPL-MCNC: 9 MG/DL
CALCIUM SERPL-MCNC: 10.3 MG/DL
CHLORIDE SERPL-SCNC: 101 MMOL/L
CHOLEST SERPL-MCNC: 217 MG/DL
CO2 SERPL-SCNC: 24 MMOL/L
CREAT SERPL-MCNC: 0.59 MG/DL
EGFR: 120 ML/MIN/1.73M2
EOSINOPHIL # BLD AUTO: 0.07 K/UL
EOSINOPHIL NFR BLD AUTO: 1.1 %
GLUCOSE SERPL-MCNC: 96 MG/DL
HCT VFR BLD CALC: 45.1 %
HCV AB SER QL: NONREACTIVE
HCV S/CO RATIO: 0.14 S/CO
HDLC SERPL-MCNC: 62 MG/DL
HGB BLD-MCNC: 14.1 G/DL
IMM GRANULOCYTES NFR BLD AUTO: 0.2 %
LDLC SERPL CALC-MCNC: 141 MG/DL
LDLC SERPL DIRECT ASSAY-MCNC: 140 MG/DL
LYMPHOCYTES # BLD AUTO: 1.56 K/UL
LYMPHOCYTES NFR BLD AUTO: 25.6 %
MAN DIFF?: NORMAL
MCHC RBC-ENTMCNC: 27.4 PG
MCHC RBC-ENTMCNC: 31.3 GM/DL
MCV RBC AUTO: 87.6 FL
MONOCYTES # BLD AUTO: 0.41 K/UL
MONOCYTES NFR BLD AUTO: 6.7 %
NEUTROPHILS # BLD AUTO: 4 K/UL
NEUTROPHILS NFR BLD AUTO: 65.7 %
NONHDLC SERPL-MCNC: 154 MG/DL
PLATELET # BLD AUTO: 425 K/UL
POTASSIUM SERPL-SCNC: 4.3 MMOL/L
PROT SERPL-MCNC: 8 G/DL
RBC # BLD: 5.15 M/UL
RBC # FLD: 12.5 %
SODIUM SERPL-SCNC: 138 MMOL/L
T4 FREE SERPL-MCNC: 1.6 NG/DL
TRIGL SERPL-MCNC: 66 MG/DL
TSH SERPL-ACNC: 2.26 UIU/ML
WBC # FLD AUTO: 6.09 K/UL

## 2022-07-18 LAB — HIV1+2 AB SPEC QL IA.RAPID: NONREACTIVE

## 2022-08-30 ENCOUNTER — RESULT REVIEW (OUTPATIENT)
Age: 37
End: 2022-08-30

## 2022-09-20 ENCOUNTER — APPOINTMENT (OUTPATIENT)
Dept: SURGERY | Facility: CLINIC | Age: 37
End: 2022-09-20

## 2022-09-20 PROCEDURE — 11900 INJECT SKIN LESIONS </W 7: CPT

## 2022-09-20 PROCEDURE — 99214 OFFICE O/P EST MOD 30 MIN: CPT | Mod: 25

## 2022-09-20 NOTE — PHYSICAL EXAM
[de-identified] : slight thickening left side of scar [de-identified] : no palpable thyroid nodules [Laryngoscopy Performed] : laryngoscopy was performed, see procedure section for findings [Midline] : located in midline position [Normal] : orientation to person, place, and time: normal

## 2022-09-20 NOTE — HISTORY OF PRESENT ILLNESS
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 denies dysphagia, hoarseness or new lesions.  continues Synthroid 125 mcg 8 doses per week. recent TG 3.45  with TSH 1.65.  seen at [facility] who did not feel Lam was indicated.  undergoing egg retrieval.  awaiting genetic results of recently harvested 11 eggs. I have reviewed all old and new data and available images.

## 2022-09-20 NOTE — ASSESSMENT
[FreeTextEntry1] : scar injected with 1/2 cc Kenalog 10.  for Thyrogen scan shortly. instructed in maneuvers to minimize scarring. to return earlier if any change. patient has been given the opportunity to ask questions, and all of the patient's questions have been answered to their satisfaction

## 2022-10-01 ENCOUNTER — NON-APPOINTMENT (OUTPATIENT)
Age: 37
End: 2022-10-01

## 2022-10-03 ENCOUNTER — APPOINTMENT (OUTPATIENT)
Dept: NUCLEAR MEDICINE | Facility: HOSPITAL | Age: 37
End: 2022-10-03

## 2022-10-04 ENCOUNTER — APPOINTMENT (OUTPATIENT)
Dept: NUCLEAR MEDICINE | Facility: HOSPITAL | Age: 37
End: 2022-10-04

## 2022-10-05 ENCOUNTER — APPOINTMENT (OUTPATIENT)
Dept: NUCLEAR MEDICINE | Facility: HOSPITAL | Age: 37
End: 2022-10-05

## 2022-10-05 ENCOUNTER — APPOINTMENT (OUTPATIENT)
Dept: INTERNAL MEDICINE | Facility: CLINIC | Age: 37
End: 2022-10-05

## 2022-10-05 PROCEDURE — 90471 IMMUNIZATION ADMIN: CPT

## 2022-10-05 PROCEDURE — 90651 9VHPV VACCINE 2/3 DOSE IM: CPT

## 2022-10-06 ENCOUNTER — APPOINTMENT (OUTPATIENT)
Dept: OPHTHALMOLOGY | Facility: CLINIC | Age: 37
End: 2022-10-06

## 2022-10-06 ENCOUNTER — NON-APPOINTMENT (OUTPATIENT)
Age: 37
End: 2022-10-06

## 2022-10-06 PROCEDURE — 92083 EXTENDED VISUAL FIELD XM: CPT

## 2022-10-06 PROCEDURE — 92133 CPTRZD OPH DX IMG PST SGM ON: CPT

## 2022-10-06 PROCEDURE — 92012 INTRM OPH EXAM EST PATIENT: CPT

## 2022-10-07 ENCOUNTER — APPOINTMENT (OUTPATIENT)
Dept: NUCLEAR MEDICINE | Facility: HOSPITAL | Age: 37
End: 2022-10-07

## 2022-10-10 ENCOUNTER — OUTPATIENT (OUTPATIENT)
Dept: OUTPATIENT SERVICES | Facility: HOSPITAL | Age: 37
LOS: 1 days | End: 2022-10-10
Payer: COMMERCIAL

## 2022-10-10 ENCOUNTER — APPOINTMENT (OUTPATIENT)
Dept: NUCLEAR MEDICINE | Facility: HOSPITAL | Age: 37
End: 2022-10-10

## 2022-10-10 DIAGNOSIS — Z90.13 ACQUIRED ABSENCE OF BILATERAL BREASTS AND NIPPLES: Chronic | ICD-10-CM

## 2022-10-10 DIAGNOSIS — C73 MALIGNANT NEOPLASM OF THYROID GLAND: ICD-10-CM

## 2022-10-10 PROCEDURE — 99202 OFFICE O/P NEW SF 15 MIN: CPT

## 2022-10-11 ENCOUNTER — APPOINTMENT (OUTPATIENT)
Dept: NUCLEAR MEDICINE | Facility: HOSPITAL | Age: 37
End: 2022-10-11

## 2022-10-12 ENCOUNTER — APPOINTMENT (OUTPATIENT)
Dept: NUCLEAR MEDICINE | Facility: HOSPITAL | Age: 37
End: 2022-10-12

## 2022-10-14 ENCOUNTER — APPOINTMENT (OUTPATIENT)
Dept: NUCLEAR MEDICINE | Facility: HOSPITAL | Age: 37
End: 2022-10-14

## 2022-10-14 PROCEDURE — 78020 THYROID MET UPTAKE: CPT | Mod: 26

## 2022-10-14 PROCEDURE — 78830 RP LOCLZJ TUM SPECT W/CT 1: CPT | Mod: 26

## 2022-10-14 PROCEDURE — 78018 THYROID MET IMAGING BODY: CPT | Mod: 26

## 2022-10-24 ENCOUNTER — APPOINTMENT (OUTPATIENT)
Dept: NUCLEAR MEDICINE | Facility: HOSPITAL | Age: 37
End: 2022-10-24

## 2022-10-24 ENCOUNTER — OUTPATIENT (OUTPATIENT)
Dept: OUTPATIENT SERVICES | Facility: HOSPITAL | Age: 37
LOS: 1 days | End: 2022-10-24
Payer: COMMERCIAL

## 2022-10-24 DIAGNOSIS — Z90.13 ACQUIRED ABSENCE OF BILATERAL BREASTS AND NIPPLES: Chronic | ICD-10-CM

## 2022-10-24 DIAGNOSIS — C73 MALIGNANT NEOPLASM OF THYROID GLAND: ICD-10-CM

## 2022-10-24 LAB — HCG SERPL-ACNC: <2 MIU/ML — SIGNIFICANT CHANGE UP

## 2022-10-25 ENCOUNTER — APPOINTMENT (OUTPATIENT)
Dept: NUCLEAR MEDICINE | Facility: HOSPITAL | Age: 37
End: 2022-10-25

## 2022-10-26 ENCOUNTER — APPOINTMENT (OUTPATIENT)
Dept: NUCLEAR MEDICINE | Facility: HOSPITAL | Age: 37
End: 2022-10-26

## 2022-10-26 PROCEDURE — A9517: CPT

## 2022-10-26 PROCEDURE — 36415 COLL VENOUS BLD VENIPUNCTURE: CPT

## 2022-10-26 PROCEDURE — 79005 NUCLEAR RX ORAL ADMIN: CPT

## 2022-10-26 PROCEDURE — 84702 CHORIONIC GONADOTROPIN TEST: CPT

## 2022-10-26 PROCEDURE — 79005 NUCLEAR RX ORAL ADMIN: CPT | Mod: 26

## 2022-10-26 PROCEDURE — 96372 THER/PROPH/DIAG INJ SC/IM: CPT

## 2022-11-01 ENCOUNTER — APPOINTMENT (OUTPATIENT)
Dept: NUCLEAR MEDICINE | Facility: HOSPITAL | Age: 37
End: 2022-11-01

## 2022-11-01 PROCEDURE — A9528: CPT

## 2022-11-01 PROCEDURE — 78020 THYROID MET UPTAKE: CPT

## 2022-11-01 PROCEDURE — 78018 THYROID MET IMAGING BODY: CPT

## 2022-11-01 PROCEDURE — 78830 RP LOCLZJ TUM SPECT W/CT 1: CPT

## 2022-11-01 PROCEDURE — 78018 THYROID MET IMAGING BODY: CPT | Mod: 26

## 2022-11-01 PROCEDURE — 96372 THER/PROPH/DIAG INJ SC/IM: CPT

## 2022-11-11 ENCOUNTER — APPOINTMENT (OUTPATIENT)
Dept: INTERNAL MEDICINE | Facility: CLINIC | Age: 37
End: 2022-11-11

## 2022-11-11 ENCOUNTER — APPOINTMENT (OUTPATIENT)
Dept: RADIOLOGY | Facility: CLINIC | Age: 37
End: 2022-11-11

## 2022-11-11 ENCOUNTER — OUTPATIENT (OUTPATIENT)
Dept: OUTPATIENT SERVICES | Facility: HOSPITAL | Age: 37
LOS: 1 days | End: 2022-11-11
Payer: COMMERCIAL

## 2022-11-11 ENCOUNTER — RESULT REVIEW (OUTPATIENT)
Age: 37
End: 2022-11-11

## 2022-11-11 VITALS
WEIGHT: 173 LBS | TEMPERATURE: 97 F | BODY MASS INDEX: 27.8 KG/M2 | HEIGHT: 66 IN | HEART RATE: 111 BPM | DIASTOLIC BLOOD PRESSURE: 70 MMHG | OXYGEN SATURATION: 98 % | SYSTOLIC BLOOD PRESSURE: 100 MMHG

## 2022-11-11 DIAGNOSIS — Z90.13 ACQUIRED ABSENCE OF BILATERAL BREASTS AND NIPPLES: Chronic | ICD-10-CM

## 2022-11-11 DIAGNOSIS — M79.602 PAIN IN LEFT ARM: ICD-10-CM

## 2022-11-11 PROCEDURE — 99214 OFFICE O/P EST MOD 30 MIN: CPT

## 2022-11-11 PROCEDURE — 73060 X-RAY EXAM OF HUMERUS: CPT | Mod: 26,LT

## 2022-11-11 PROCEDURE — 73060 X-RAY EXAM OF HUMERUS: CPT

## 2022-11-11 RX ORDER — UBIDECARENONE/VIT E ACET 100MG-5
CAPSULE ORAL
Refills: 0 | Status: ACTIVE | COMMUNITY

## 2022-11-11 RX ORDER — FOLLITROPIN 900 [IU]/1.5ML
900 INJECTION, SOLUTION SUBCUTANEOUS
Qty: 6 | Refills: 0 | Status: DISCONTINUED | COMMUNITY
Start: 2022-07-10 | End: 2022-11-11

## 2022-11-11 RX ORDER — MENOTROPINS 75 UNIT
75 KIT SUBCUTANEOUS
Qty: 25 | Refills: 0 | Status: DISCONTINUED | COMMUNITY
Start: 2022-07-10 | End: 2022-11-11

## 2022-11-11 RX ORDER — LEUPROLIDE ACETATE 1 MG/0.2ML
1 KIT SUBCUTANEOUS
Qty: 10 | Refills: 0 | Status: DISCONTINUED | COMMUNITY
Start: 2022-07-11 | End: 2022-11-11

## 2022-11-11 NOTE — HISTORY OF PRESENT ILLNESS
[FreeTextEntry8] : CC: arm pain [de-identified] : \par L arm pain-medial- started 1 mo ago-intermittent. better w advil, ice but not completely resolved.  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  duration sev hrs.severe squeezing pain- when mild it is a dull pain.\par no neck pain.  L arm weaker.  no number\par Thyroid CA- being f/u by ENT -5/17/22,  2/24/22 US thyroid.  reviewed 9/20/22 ENT notes.  got nuclear scan and got XRT\par constipation- after thyroidectomy- better since taking probiotic- now back to nl\par reviewed 10/6/22 Ophth notes- glaucoma suspect\par reviewed 9/19/22 labs\par pt was evaluated by fertility- restarting fertility tx now , endo- being f/u by Dr. Palmer- awaiting implantation after 1 yr\par  \par has itchy, rash under L axilla- since Sept- painful until she used lotrisone.  now no pain but still has black discoloration. \par no new detergents. \par .

## 2022-11-11 NOTE — PLAN
[FreeTextEntry1] : arm pain- trial of heat or ice, mobic.  avoid carrying wgt on L arm.  if not improving - xray, rehab

## 2022-11-11 NOTE — PHYSICAL EXAM
[de-identified] : good ROM of L shoulder, elbow- no pain. tenderness on palpation of medial aspect of L upper arm [de-identified] : black macular rash under L axilla

## 2022-12-05 ENCOUNTER — APPOINTMENT (OUTPATIENT)
Dept: INTERNAL MEDICINE | Facility: CLINIC | Age: 37
End: 2022-12-05

## 2022-12-05 PROCEDURE — 90471 IMMUNIZATION ADMIN: CPT

## 2022-12-05 PROCEDURE — 90651 9VHPV VACCINE 2/3 DOSE IM: CPT

## 2022-12-29 ENCOUNTER — APPOINTMENT (OUTPATIENT)
Dept: SURGERY | Facility: CLINIC | Age: 37
End: 2022-12-29

## 2022-12-29 ENCOUNTER — LABORATORY RESULT (OUTPATIENT)
Age: 37
End: 2022-12-29

## 2022-12-29 PROCEDURE — 99214 OFFICE O/P EST MOD 30 MIN: CPT | Mod: 25

## 2022-12-29 PROCEDURE — 36415 COLL VENOUS BLD VENIPUNCTURE: CPT

## 2022-12-29 NOTE — HISTORY OF PRESENT ILLNESS
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 denies dysphagia, hoarseness or new lesions.  continues Synthroid 125 mcg 8 doses per week.  [place] [facility] [place] feel [place]ANG was indicated, but was ultimately treated with 30 mCi.   I have reviewed all old and new data and available images.

## 2022-12-29 NOTE — PHYSICAL EXAM
[de-identified] : scar flat, well healed [de-identified] : no palpable thyroid nodules [Laryngoscopy Performed] : laryngoscopy was performed, see procedure section for findings [Midline] : located in midline position [Normal] : orientation to person, place, and time: normal

## 2022-12-29 NOTE — ASSESSMENT
[FreeTextEntry1] : will observe. bloods drawn. to call next week for results. sonogram next visit.  to return earlier if any change. patient has been given the opportunity to ask questions, and all of the patient's questions have been answered to their satisfaction

## 2022-12-30 LAB
T3 SERPL-MCNC: 91 NG/DL
T4 FREE SERPL-MCNC: 1.4 NG/DL
THYROGLOB AB SERPL-ACNC: <20 IU/ML
THYROGLOB SERPL-MCNC: <0.2 NG/ML
TSH SERPL-ACNC: 0.76 UIU/ML

## 2023-01-03 ENCOUNTER — NON-APPOINTMENT (OUTPATIENT)
Age: 38
End: 2023-01-03

## 2023-01-12 ENCOUNTER — APPOINTMENT (OUTPATIENT)
Dept: INTERNAL MEDICINE | Facility: CLINIC | Age: 38
End: 2023-01-12

## 2023-04-05 ENCOUNTER — APPOINTMENT (OUTPATIENT)
Dept: INTERNAL MEDICINE | Facility: CLINIC | Age: 38
End: 2023-04-05
Payer: COMMERCIAL

## 2023-04-05 PROCEDURE — 90471 IMMUNIZATION ADMIN: CPT

## 2023-04-05 PROCEDURE — 90651 9VHPV VACCINE 2/3 DOSE IM: CPT

## 2023-06-07 ENCOUNTER — APPOINTMENT (OUTPATIENT)
Dept: ULTRASOUND IMAGING | Facility: CLINIC | Age: 38
End: 2023-06-07
Payer: COMMERCIAL

## 2023-06-07 PROCEDURE — 76536 US EXAM OF HEAD AND NECK: CPT

## 2023-06-22 ENCOUNTER — APPOINTMENT (OUTPATIENT)
Dept: SURGERY | Facility: CLINIC | Age: 38
End: 2023-06-22
Payer: COMMERCIAL

## 2023-06-22 PROCEDURE — 99214 OFFICE O/P EST MOD 30 MIN: CPT

## 2023-06-22 NOTE — HISTORY OF PRESENT ILLNESS
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 denies dysphagia, hoarseness or new lesions.  continues Synthroid 125 mcg 8 doses per week.  [place] [facility] [place] YANG was indicated, but was ultimately treated with 30 mCi.   recent sonogram BLESSING. for embryo transfer next month.   I have reviewed all old and new data and available images.  recent TFTs normal.

## 2023-06-22 NOTE — ASSESSMENT
[FreeTextEntry1] : will observe. bloods, scan and . sonogram per dr Palmer.  to return earlier if any change. patient has been given the opportunity to ask questions, and all of the patient's questions have been answered to their satisfaction

## 2023-06-22 NOTE — PHYSICAL EXAM
[de-identified] : scar flat, well healed [de-identified] : no palpable thyroid nodules [Laryngoscopy Performed] : laryngoscopy was performed, see procedure section for findings [Midline] : located in midline position [Normal] : orientation to person, place, and time: normal

## 2023-07-04 NOTE — PROCEDURE
[FreeTextEntry1] : Bilateral thyroid nodule US-guided FNA performed under sterile conditions using 1% lidocaine with epinephrine. \par Specimens sent for pathology, affirma held\par Patient tolerated procedure well. 
None

## 2023-07-11 ENCOUNTER — APPOINTMENT (OUTPATIENT)
Dept: OPHTHALMOLOGY | Facility: CLINIC | Age: 38
End: 2023-07-11

## 2023-07-17 ENCOUNTER — APPOINTMENT (OUTPATIENT)
Dept: INTERNAL MEDICINE | Facility: CLINIC | Age: 38
End: 2023-07-17

## 2023-07-18 LAB
ALBUMIN SERPL ELPH-MCNC: 4.3 G/DL
ALP BLD-CCNC: 67 U/L
ALT SERPL-CCNC: 6 U/L
ANION GAP SERPL CALC-SCNC: 14 MMOL/L
AST SERPL-CCNC: 15 U/L
BILIRUB SERPL-MCNC: 0.3 MG/DL
BUN SERPL-MCNC: 8 MG/DL
CALCIUM SERPL-MCNC: 9.4 MG/DL
CHLORIDE SERPL-SCNC: 103 MMOL/L
CHOLEST SERPL-MCNC: 224 MG/DL
CO2 SERPL-SCNC: 21 MMOL/L
CREAT SERPL-MCNC: 0.63 MG/DL
EGFR: 117 ML/MIN/1.73M2
ESTIMATED AVERAGE GLUCOSE: 108 MG/DL
GLUCOSE SERPL-MCNC: 95 MG/DL
HBA1C MFR BLD HPLC: 5.4 %
HDLC SERPL-MCNC: 64 MG/DL
LDLC SERPL CALC-MCNC: 136 MG/DL
NONHDLC SERPL-MCNC: 160 MG/DL
POTASSIUM SERPL-SCNC: 4.8 MMOL/L
PROT SERPL-MCNC: 6.8 G/DL
SODIUM SERPL-SCNC: 137 MMOL/L
T4 FREE SERPL-MCNC: 1.2 NG/DL
TRIGL SERPL-MCNC: 134 MG/DL
TSH SERPL-ACNC: 2.18 UIU/ML

## 2023-07-20 ENCOUNTER — NON-APPOINTMENT (OUTPATIENT)
Age: 38
End: 2023-07-20

## 2023-07-21 ENCOUNTER — APPOINTMENT (OUTPATIENT)
Dept: INTERNAL MEDICINE | Facility: CLINIC | Age: 38
End: 2023-07-21
Payer: COMMERCIAL

## 2023-07-21 ENCOUNTER — NON-APPOINTMENT (OUTPATIENT)
Age: 38
End: 2023-07-21

## 2023-07-21 VITALS
BODY MASS INDEX: 28.12 KG/M2 | OXYGEN SATURATION: 99 % | WEIGHT: 175 LBS | HEART RATE: 79 BPM | SYSTOLIC BLOOD PRESSURE: 100 MMHG | HEIGHT: 66 IN | DIASTOLIC BLOOD PRESSURE: 70 MMHG

## 2023-07-21 DIAGNOSIS — D36.9 BENIGN NEOPLASM, UNSPECIFIED SITE: ICD-10-CM

## 2023-07-21 DIAGNOSIS — Z00.00 ENCOUNTER FOR GENERAL ADULT MEDICAL EXAMINATION W/OUT ABNORMAL FINDINGS: ICD-10-CM

## 2023-07-21 DIAGNOSIS — Z23 ENCOUNTER FOR IMMUNIZATION: ICD-10-CM

## 2023-07-21 DIAGNOSIS — M79.602 PAIN IN LEFT ARM: ICD-10-CM

## 2023-07-21 DIAGNOSIS — R21 RASH AND OTHER NONSPECIFIC SKIN ERUPTION: ICD-10-CM

## 2023-07-21 DIAGNOSIS — Z86.39 PERSONAL HISTORY OF OTHER ENDOCRINE, NUTRITIONAL AND METABOLIC DISEASE: ICD-10-CM

## 2023-07-21 DIAGNOSIS — D18.03 HEMANGIOMA OF INTRA-ABDOMINAL STRUCTURES: ICD-10-CM

## 2023-07-21 DIAGNOSIS — E66.3 OVERWEIGHT: ICD-10-CM

## 2023-07-21 DIAGNOSIS — E78.5 HYPERLIPIDEMIA, UNSPECIFIED: ICD-10-CM

## 2023-07-21 PROCEDURE — 93000 ELECTROCARDIOGRAM COMPLETE: CPT

## 2023-07-21 PROCEDURE — 99395 PREV VISIT EST AGE 18-39: CPT | Mod: 25

## 2023-07-21 RX ORDER — MELOXICAM 15 MG/1
15 TABLET ORAL
Qty: 30 | Refills: 0 | Status: DISCONTINUED | COMMUNITY
Start: 2022-11-11 | End: 2023-07-21

## 2023-07-21 RX ORDER — KETOCONAZOLE 20 MG/G
2 CREAM TOPICAL TWICE DAILY
Qty: 1 | Refills: 0 | Status: DISCONTINUED | COMMUNITY
Start: 2022-11-11 | End: 2023-07-21

## 2023-07-21 NOTE — PHYSICAL EXAM
[No Acute Distress] : no acute distress [Well Nourished] : well nourished [Well Developed] : well developed [Well-Appearing] : well-appearing [Normal Sclera/Conjunctiva] : normal sclera/conjunctiva [PERRL] : pupils equal round and reactive to light [Normal Outer Ear/Nose] : the outer ears and nose were normal in appearance [Normal TMs] : both tympanic membranes were normal [No Lymphadenopathy] : no lymphadenopathy [Supple] : supple [No Respiratory Distress] : no respiratory distress  [No Accessory Muscle Use] : no accessory muscle use [Clear to Auscultation] : lungs were clear to auscultation bilaterally [Normal Rate] : normal rate  [Regular Rhythm] : with a regular rhythm [Normal S1, S2] : normal S1 and S2 [No Murmur] : no murmur heard [No Edema] : there was no peripheral edema [Soft] : abdomen soft [Non Tender] : non-tender [Non-distended] : non-distended [No Masses] : no abdominal mass palpated [Normal Bowel Sounds] : normal bowel sounds [Normal Supraclavicular Nodes] : no supraclavicular lymphadenopathy [Normal Axillary Nodes] : no axillary lymphadenopathy [Normal Posterior Cervical Nodes] : no posterior cervical lymphadenopathy [Normal Anterior Cervical Nodes] : no anterior cervical lymphadenopathy [Normal Inguinal Nodes] : no inguinal lymphadenopathy [Grossly Normal Strength/Tone] : grossly normal strength/tone [No Focal Deficits] : no focal deficits [Normal Gait] : normal gait [Normal Affect] : the affect was normal [Normal Insight/Judgement] : insight and judgment were intact

## 2023-07-21 NOTE — HEALTH RISK ASSESSMENT
[0] : 2) Feeling down, depressed, or hopeless: Not at all (0) [PHQ-2 Negative - No further assessment needed] : PHQ-2 Negative - No further assessment needed [Patient reported mammogram was normal] : Patient reported mammogram was normal [Patient reported PAP Smear was normal] : Patient reported PAP Smear was normal [HIV Test offered] : HIV Test offered [Hepatitis C test offered] : Hepatitis C test offered [With Patient/Caregiver] : , with patient/caregiver [Designated Healthcare Proxy] : Designated healthcare proxy [Relationship: ___] : Relationship: [unfilled] [No] : No [Never (0 pts)] : Never (0 points) [Audit-CScore] : 0 [[id number]] : 0 [Patient reported colonoscopy was abnormal] : Patient reported colonoscopy was abnormal [Reports changes in vision] : Reports no changes in vision [MammogramDate] : 06/20 [MammogramComments] : had mastectomy [PapSmearDate] : 06/21 [PapSmearComments] : as per pt.  pt will f/u w GYN next mo.  had double masectomy [ColonoscopyDate] : 05/23 [ColonoscopyComments] : 3cm tubular adenoma- f/u 6-12mo [de-identified] : seen ophth- 3/2023- f/u every 6 mo [de-identified] : seen dentist 05/23 [AdvancecareDate] : 07/23 [Never] : Never

## 2023-07-21 NOTE — HISTORY OF PRESENT ILLNESS
[FreeTextEntry1] : CPE [de-identified] : vaccine\par diet- healthy diet reviewed\par exercise-no\par  reviewed 7/18/23 labs [id number]\par pt states she also saw GI for liver hemangioma. \par states had 3 cm tubular adenoma- states had to specialist for ?MRCP to remove adenoma- pt will f/u w GI this yr\par L shoulder pain-medial-- reviewed 11/11/22 xray humerus-nl.  pt is seeing Dr. Rosenstein, Neuro. pain when sleeping on L arm. \par pt has been doing  PT since March- now 50 % better.  insurance rejected MRI as per pt-? neck\par Thyroid CA-reviewed 12/19/22, 6/22/23  [facility] surg notes.   6/7/23 US thyroid.  reviewed 6/1/23 Endo notes \par reviewed 10/6/22 Ophth notes- glaucoma suspect\par pt was evaluated by fertility- restarting fertility tx now , endo- being f/u by Dr. Palmer- awaiting implantation after 1 yr\par  \par has itchy, rash under L axilla- since Sept- painful until she used lotrisone.  now no pain but still has black discoloration. \par no new detergents. \par .

## 2023-07-21 NOTE — PLAN
[FreeTextEntry1] : EKG- NSR 82\par overweight- diet/ exercise discussed\par chol- rec low chol diet\par f/u w GI- pt will release copy of colonoscopy, MRI

## 2023-07-26 NOTE — ASU PATIENT PROFILE, ADULT - PRO INTERPRETER NEED 2
Pt due for office visit     Called patient's mother no answer, left voicemail for patient to return call to Ara Jasso NP office.     ECO Representative: Please reach out to [facility] [id number] [facility] via Epic Secure Chat to see if a team member is available to take patient's call.     If team member is unavailable, please document in this encounter that patient returned call and route to: [facility] NON TRIAGE POOL.    English

## 2023-07-29 ENCOUNTER — NON-APPOINTMENT (OUTPATIENT)
Age: 38
End: 2023-07-29

## 2023-08-15 ENCOUNTER — APPOINTMENT (OUTPATIENT)
Dept: ORTHOPEDIC SURGERY | Facility: CLINIC | Age: 38
End: 2023-08-15
Payer: COMMERCIAL

## 2023-08-15 VITALS — BODY MASS INDEX: 28.93 KG/M2 | WEIGHT: 180 LBS | HEIGHT: 66 IN

## 2023-08-15 DIAGNOSIS — M25.512 PAIN IN LEFT SHOULDER: ICD-10-CM

## 2023-08-15 PROCEDURE — 99203 OFFICE O/P NEW LOW 30 MIN: CPT

## 2023-08-15 PROCEDURE — 73030 X-RAY EXAM OF SHOULDER: CPT | Mod: LT

## 2023-08-23 PROBLEM — M25.512 LEFT SHOULDER PAIN: Status: ACTIVE | Noted: 2023-08-23

## 2023-08-23 NOTE — HISTORY OF PRESENT ILLNESS
[de-identified] : 38 year old RHD female presents today with left shoulder pain since October 2022. She developed biceps pain in October 2022 and was evaluated by Neurologist, referred for PT which gave her mild relief. The pain in bicep resolved with PT. She has been having scapular pain in the past few months. Pain is constant worse in the morning, and it improves toward end of day. Advil is helpful. Patient has history of thyroid concerned and is nervous about shoulder pain.  The patient's past medical history, past surgical history, medications and allergies were reviewed by me today with the patient and documented accordingly. In addition, the patient's family and social history, which were noncontributory to this visit, were reviewed also.

## 2023-08-23 NOTE — DISCUSSION/SUMMARY
[de-identified] : 39 y/o female with left shoulder periscpaular pain.   A discussion was had with the patient regarding potential sources of inflammatory shoulder discomfort; including rotator cuff tendon dysfunction (including tendonitis vs. internal structural damage), subdeltoid/subacromial bursitis, or impingement of the rotator cuff at the acromion. Other contributing sources of pain may be the acromioclavicular joint or long head of the biceps tendon. Irritation is typically caused either by overhead repetitive activity or as a result of minor injury.   I discussed with the patient that the majority of symptoms are not directly related to shoulder internal derrangement/pathology and may or may not be directly related to cervical dysfunction.  Primary complaint is periscapular dysfunction to the left upper extremity.  Treatment for this condition is through conservative/nonoperative means; including anti-inflammatory medication, physical therapy, massage therapy, acupuncture, and chiropractic care.  Recommendation: PT evaluation and treatment.Heat/modalities/ROM/strengthening to tolerance. NSAIDS.   Followup p.r.n.

## 2023-08-23 NOTE — PHYSICAL EXAM
[de-identified] : Left shoulder exam:  Inspection: No malalignment, No defects, No atrophy Skin: No masses, No lesions Neck: Negative Spurling, full ROM, no pain with ROM AROM: FF to 180, abduction to 90, ER to 90, IR to upper lumbar Painful arc ROM: none Tenderness: No bicipital tenderness, no tenderness to greater tuberosity/RTC insertion, no anterior shoulder/lesser tuberosity tenderness Strength: 5/5 ER, 5/5 IR in adduction, 5/5 supraspinatus testing, negative Sargent's test AC joint: No TTP/pain with cross arm testing Biceps: Speed Negative, Yergason Negative  Impingement test: Negative Finch, Negative Neer Vasc: 2+ radial pulse  Stability: Stable  Neuro: AIN, PIN, Ulnar nerve intact to motor Sensation: Intact to light touch throughout  [de-identified] : The following radiographs were ordered and read by me during this patients visit. I reviewed each radiograph in detail with the patient and discussed the findings as highlighted below.  3 views of left shoulder were obtained today, , that show no acute fracture or dislocation. There is no glenohumeral and no AC joint degenerative change seen. Type II acromion. There is no significant malalignment. No significant other obvious osseous abnormality, otherwise unremarkable.

## 2023-08-23 NOTE — ADDENDUM
[FreeTextEntry1] : This note was written by Maria Elena Gomez on 08/15/2023 acting solely as a scribe for Dr. Mc Rios.  All medical record entries made by the Scribe were at my, Dr. Mc Rios, direction and personally dictated by me on 08/15/2023. I have personally reviewed the chart and agree that the record accurately reflects my personal performance of the history, physical exam, assessment and plan.

## 2023-10-05 ENCOUNTER — APPOINTMENT (OUTPATIENT)
Dept: OPHTHALMOLOGY | Facility: CLINIC | Age: 38
End: 2023-10-05
Payer: COMMERCIAL

## 2023-10-05 ENCOUNTER — NON-APPOINTMENT (OUTPATIENT)
Age: 38
End: 2023-10-05

## 2023-10-05 PROCEDURE — 92014 COMPRE OPH EXAM EST PT 1/>: CPT

## 2023-10-05 PROCEDURE — 92250 FUNDUS PHOTOGRAPHY W/I&R: CPT

## 2023-11-10 ENCOUNTER — NON-APPOINTMENT (OUTPATIENT)
Age: 38
End: 2023-11-10

## 2023-12-05 ENCOUNTER — APPOINTMENT (OUTPATIENT)
Dept: SURGERY | Facility: CLINIC | Age: 38
End: 2023-12-05
Payer: COMMERCIAL

## 2023-12-05 PROCEDURE — 99214 OFFICE O/P EST MOD 30 MIN: CPT

## 2023-12-15 ENCOUNTER — TRANSCRIPTION ENCOUNTER (OUTPATIENT)
Age: 38
End: 2023-12-15

## 2024-03-07 ENCOUNTER — NON-APPOINTMENT (OUTPATIENT)
Age: 39
End: 2024-03-07

## 2024-04-22 DIAGNOSIS — C73 MALIGNANT NEOPLASM OF THYROID GLAND: ICD-10-CM

## 2024-04-22 DIAGNOSIS — E06.3 AUTOIMMUNE THYROIDITIS: ICD-10-CM

## 2024-06-26 ENCOUNTER — APPOINTMENT (OUTPATIENT)
Dept: OBGYN | Facility: CLINIC | Age: 39
End: 2024-06-26

## 2024-07-09 ENCOUNTER — APPOINTMENT (OUTPATIENT)
Dept: OBGYN | Facility: CLINIC | Age: 39
End: 2024-07-09
Payer: COMMERCIAL

## 2024-07-09 ENCOUNTER — ASOB RESULT (OUTPATIENT)
Age: 39
End: 2024-07-09

## 2024-07-09 VITALS
SYSTOLIC BLOOD PRESSURE: 117 MMHG | HEIGHT: 66 IN | DIASTOLIC BLOOD PRESSURE: 80 MMHG | BODY MASS INDEX: 32.47 KG/M2 | WEIGHT: 202 LBS

## 2024-07-09 DIAGNOSIS — N91.0 PRIMARY AMENORRHEA: ICD-10-CM

## 2024-07-09 PROCEDURE — 76801 OB US < 14 WKS SINGLE FETUS: CPT

## 2024-07-09 PROCEDURE — 36415 COLL VENOUS BLD VENIPUNCTURE: CPT

## 2024-07-09 PROCEDURE — 0500F INITIAL PRENATAL CARE VISIT: CPT

## 2024-07-10 LAB
ABO + RH PNL BLD: NORMAL
BASOPHILS # BLD AUTO: 0.04 K/UL
BASOPHILS NFR BLD AUTO: 0.3 %
C TRACH RRNA SPEC QL NAA+PROBE: NOT DETECTED
EOSINOPHIL # BLD AUTO: 0.11 K/UL
EOSINOPHIL NFR BLD AUTO: 0.9 %
HBV SURFACE AG SER QL: NONREACTIVE
HCT VFR BLD CALC: 42.2 %
HCV AB SER QL: NONREACTIVE
HCV S/CO RATIO: 0.14 S/CO
HGB A MFR BLD: 97.5 %
HGB A2 MFR BLD: 2.5 %
HGB BLD-MCNC: 13.2 G/DL
HGB FRACT BLD-IMP: NORMAL
HIV1+2 AB SPEC QL IA.RAPID: NONREACTIVE
HPV HIGH+LOW RISK DNA PNL CVX: NOT DETECTED
IMM GRANULOCYTES NFR BLD AUTO: 0.6 %
LYMPHOCYTES NFR BLD AUTO: 15.2 %
MAN DIFF?: NORMAL
MCHC RBC-ENTMCNC: 27.2 PG
MCHC RBC-ENTMCNC: 31.3 GM/DL
MCV RBC AUTO: 86.8 FL
MONOCYTES # BLD AUTO: 0.68 K/UL
MONOCYTES NFR BLD AUTO: 5.9 %
N GONORRHOEA RRNA SPEC QL NAA+PROBE: NOT DETECTED
NEUTROPHILS # BLD AUTO: 8.93 K/UL
NEUTROPHILS NFR BLD AUTO: 77.1 %
PLATELET # BLD AUTO: 444 K/UL
RBC # BLD: 4.86 M/UL
RBC # FLD: 13.4 %
SOURCE AMPLIFICATION: NORMAL
T PALLIDUM AB SER QL IA: NEGATIVE
T4 FREE SERPL-MCNC: 1 NG/DL
TSH SERPL-ACNC: 2.42 UIU/ML
WBC # FLD AUTO: 11.59 K/UL

## 2024-07-11 LAB
BACTERIA UR CULT: NORMAL
LEAD BLD-MCNC: <1 UG/DL
MEV IGG FLD QL IA: 109 AU/ML
MEV IGG+IGM SER-IMP: POSITIVE
RUBV IGG FLD-ACNC: 1.41 INDEX
VZV AB TITR SER: POSITIVE
VZV IGG SER IF-ACNC: 311.1 INDEX

## 2024-07-12 ENCOUNTER — NON-APPOINTMENT (OUTPATIENT)
Age: 39
End: 2024-07-12

## 2024-07-15 ENCOUNTER — NON-APPOINTMENT (OUTPATIENT)
Age: 39
End: 2024-07-15

## 2024-07-15 LAB
ALP BLD-CCNC: 53 U/L
ALT SERPL-CCNC: 6 U/L
ANION GAP SERPL CALC-SCNC: 12 MMOL/L
AST SERPL-CCNC: 14 U/L
BASOPHILS # BLD AUTO: 0.03 K/UL
BASOPHILS NFR BLD AUTO: 0.3 %
BILIRUB SERPL-MCNC: 0.2 MG/DL
BUN SERPL-MCNC: 10 MG/DL
CALCIUM SERPL-MCNC: 9.2 MG/DL
CHLORIDE SERPL-SCNC: 102 MMOL/L
CHOLEST SERPL-MCNC: 243 MG/DL
CO2 SERPL-SCNC: 20 MMOL/L
CREAT SERPL-MCNC: 0.54 MG/DL
EOSINOPHIL # BLD AUTO: 0.11 K/UL
EOSINOPHIL NFR BLD AUTO: 1.2 %
ESTIMATED AVERAGE GLUCOSE: 111 MG/DL
GLUCOSE SERPL-MCNC: 95 MG/DL
HCT VFR BLD CALC: 38.6 %
HDLC SERPL-MCNC: 70 MG/DL
HGB BLD-MCNC: 12.4 G/DL
IMM GRANULOCYTES NFR BLD AUTO: 0.5 %
LDLC SERPL CALC-MCNC: 149 MG/DL
LDLC SERPL DIRECT ASSAY-MCNC: 152 MG/DL
LYMPHOCYTES # BLD AUTO: 1.83 K/UL
LYMPHOCYTES NFR BLD AUTO: 19.5 %
MCHC RBC-ENTMCNC: 26.9 PG
MCHC RBC-ENTMCNC: 32.1 GM/DL
MCV RBC AUTO: 83.7 FL
MONOCYTES # BLD AUTO: 0.58 K/UL
MONOCYTES NFR BLD AUTO: 6.2 %
NEUTROPHILS # BLD AUTO: 6.79 K/UL
NEUTROPHILS NFR BLD AUTO: 72.3 %
PLATELET # BLD AUTO: 423 K/UL
POTASSIUM SERPL-SCNC: 4.5 MMOL/L
RBC # BLD: 4.61 M/UL
RBC # FLD: 12.7 %
SODIUM SERPL-SCNC: 135 MMOL/L
T4 FREE SERPL-MCNC: 1.3 NG/DL
TRIGL SERPL-MCNC: 135 MG/DL
TSH SERPL-ACNC: 3.81 UIU/ML
WBC # FLD AUTO: 9.39 K/UL

## 2024-07-17 ENCOUNTER — APPOINTMENT (OUTPATIENT)
Dept: OBGYN | Facility: CLINIC | Age: 39
End: 2024-07-17
Payer: COMMERCIAL

## 2024-07-17 VITALS — WEIGHT: 202 LBS | SYSTOLIC BLOOD PRESSURE: 115 MMHG | DIASTOLIC BLOOD PRESSURE: 80 MMHG | BODY MASS INDEX: 32.6 KG/M2

## 2024-07-17 PROCEDURE — 36415 COLL VENOUS BLD VENIPUNCTURE: CPT

## 2024-07-17 PROCEDURE — 0502F SUBSEQUENT PRENATAL CARE: CPT

## 2024-07-18 ENCOUNTER — APPOINTMENT (OUTPATIENT)
Dept: OBGYN | Facility: CLINIC | Age: 39
End: 2024-07-18
Payer: COMMERCIAL

## 2024-07-18 ENCOUNTER — ASOB RESULT (OUTPATIENT)
Age: 39
End: 2024-07-18

## 2024-07-18 ENCOUNTER — TRANSCRIPTION ENCOUNTER (OUTPATIENT)
Age: 39
End: 2024-07-18

## 2024-07-18 VITALS — WEIGHT: 178 LBS | DIASTOLIC BLOOD PRESSURE: 80 MMHG | BODY MASS INDEX: 28.73 KG/M2 | SYSTOLIC BLOOD PRESSURE: 115 MMHG

## 2024-07-18 PROBLEM — O20.9 BLEEDING IN EARLY PREGNANCY: Status: ACTIVE | Noted: 2024-07-18

## 2024-07-18 PROBLEM — Z3A.10 10 WEEKS GESTATION OF PREGNANCY: Status: ACTIVE | Noted: 2024-07-18

## 2024-07-18 PROCEDURE — 0502F SUBSEQUENT PRENATAL CARE: CPT

## 2024-07-18 PROCEDURE — 76801 OB US < 14 WKS SINGLE FETUS: CPT

## 2024-07-26 ENCOUNTER — NON-APPOINTMENT (OUTPATIENT)
Age: 39
End: 2024-07-26

## 2024-07-26 ENCOUNTER — APPOINTMENT (OUTPATIENT)
Dept: INTERNAL MEDICINE | Facility: CLINIC | Age: 39
End: 2024-07-26

## 2024-07-26 VITALS
SYSTOLIC BLOOD PRESSURE: 126 MMHG | OXYGEN SATURATION: 100 % | TEMPERATURE: 97 F | WEIGHT: 204 LBS | HEART RATE: 93 BPM | HEIGHT: 66 IN | DIASTOLIC BLOOD PRESSURE: 85 MMHG | BODY MASS INDEX: 32.78 KG/M2

## 2024-07-26 DIAGNOSIS — Z3A.10 10 WEEKS GESTATION OF PREGNANCY: ICD-10-CM

## 2024-07-26 DIAGNOSIS — R06.09 OTHER FORMS OF DYSPNEA: ICD-10-CM

## 2024-07-26 DIAGNOSIS — E78.5 HYPERLIPIDEMIA, UNSPECIFIED: ICD-10-CM

## 2024-07-26 DIAGNOSIS — R21 RASH AND OTHER NONSPECIFIC SKIN ERUPTION: ICD-10-CM

## 2024-07-26 DIAGNOSIS — E03.9 HYPOTHYROIDISM, UNSPECIFIED: ICD-10-CM

## 2024-07-26 DIAGNOSIS — Z00.00 ENCOUNTER FOR GENERAL ADULT MEDICAL EXAMINATION W/OUT ABNORMAL FINDINGS: ICD-10-CM

## 2024-07-26 DIAGNOSIS — O20.9 HEMORRHAGE IN EARLY PREGNANCY, UNSPECIFIED: ICD-10-CM

## 2024-07-26 PROCEDURE — 93000 ELECTROCARDIOGRAM COMPLETE: CPT

## 2024-07-26 PROCEDURE — 99395 PREV VISIT EST AGE 18-39: CPT

## 2024-07-26 RX ORDER — ASPIRIN 81 MG
81 TABLET, DELAYED RELEASE (ENTERIC COATED) ORAL
Refills: 0 | Status: ACTIVE | COMMUNITY

## 2024-07-26 RX ORDER — LEVOTHYROXINE SODIUM 0.2 MG/1
200 TABLET ORAL
Qty: 90 | Refills: 0 | Status: ACTIVE | COMMUNITY
Start: 2024-07-26

## 2024-07-26 NOTE — HEALTH RISK ASSESSMENT
[Never (0 pts)] : Never (0 points) [No] : In the past 12 months have you used drugs other than those required for medical reasons? No [0] : 2) Feeling down, depressed, or hopeless: Not at all (0) [PHQ-2 Negative - No further assessment needed] : PHQ-2 Negative - No further assessment needed [Never] : Never [Patient reported mammogram was normal] : Patient reported mammogram was normal [Patient reported PAP Smear was normal] : Patient reported PAP Smear was normal [Patient reported colonoscopy was abnormal] : Patient reported colonoscopy was abnormal [HIV Test offered] : HIV Test offered [Hepatitis C test offered] : Hepatitis C test offered [With Patient/Caregiver] : , with patient/caregiver [Designated Healthcare Proxy] : Designated healthcare proxy [Relationship: ___] : Relationship: [unfilled] [NO] : No [Audit-CScore] : 0 [SBA1Xlbls] : 0 [Reports changes in vision] : Reports no changes in vision [MammogramDate] : 06/20 [MammogramComments] : had mastectomy- f/u w Dr. Fuentes [PapSmearDate] : 07/24 [PapSmearComments] : as per pt.  pt will f/u w GYN next mo.  had double masectomy [ColonoscopyDate] : 01/24 [ColonoscopyComments] : 3cm tubular adenoma- f/u 6-12mo [de-identified] : seen [facility]- 10/2023- pt will f/u [de-identified] : seen dentist 05/23 [AdvancecareDate] : 07/24

## 2024-07-26 NOTE — PHYSICAL EXAM
[No Acute Distress] : no acute distress [Well Nourished] : well nourished [Well Developed] : well developed [Well-Appearing] : well-appearing [Normal Sclera/Conjunctiva] : normal sclera/conjunctiva [PERRL] : pupils equal round and reactive to light [Normal Outer Ear/Nose] : the outer ears and nose were normal in appearance [Normal TMs] : both tympanic membranes were normal [No Lymphadenopathy] : no lymphadenopathy [Supple] : supple [No Respiratory Distress] : no respiratory distress  [No Accessory Muscle Use] : no accessory muscle use [Clear to Auscultation] : lungs were clear to auscultation bilaterally [Normal Rate] : normal rate  [Regular Rhythm] : with a regular rhythm [Normal S1, S2] : normal S1 and S2 [No Murmur] : no murmur heard [No Edema] : there was no peripheral edema [Soft] : abdomen soft [Non Tender] : non-tender [Non-distended] : non-distended [No Masses] : no abdominal mass palpated [Normal Bowel Sounds] : normal bowel sounds [Normal Supraclavicular Nodes] : no supraclavicular lymphadenopathy [Normal Axillary Nodes] : no axillary lymphadenopathy [Normal Posterior Cervical Nodes] : no posterior cervical lymphadenopathy [Normal Anterior Cervical Nodes] : no anterior cervical lymphadenopathy [Normal Inguinal Nodes] : no inguinal lymphadenopathy [Grossly Normal Strength/Tone] : grossly normal strength/tone [No Focal Deficits] : no focal deficits [Normal Gait] : normal gait [Normal Affect] : the affect was normal [Normal Insight/Judgement] : insight and judgment were intact [de-identified] : scaly , hyperpigmented macular rash- L axilla

## 2024-07-26 NOTE — HISTORY OF PRESENT ILLNESS
[FreeTextEntry1] : CPE [de-identified] : vaccine- up to date diet- healthy diet reviewed exercise-no  reviewed 7/15/24  pt is 12 wk preg.  c/o incr TORRES over past 2 mo- when climbing stairs. pt states she also saw GI for liver hemangioma.  L shoulder pain-medial--now resolved reviewed 11/11/22 xray humerus-nl.  reviewed 8/15/23 Ortho notes- ref to PT Thyroid CA-reviewed 12/19/22, 6/22/23  [facility] surg notes.   6/7/23 US thyroid.  .f/u by Dr. Palmer 12/5/23 Head and neck notes reviewed 10/5/23 Ophth notes- glaucoma suspect

## 2024-07-30 ENCOUNTER — APPOINTMENT (OUTPATIENT)
Dept: OBGYN | Facility: CLINIC | Age: 39
End: 2024-07-30

## 2024-08-01 ENCOUNTER — APPOINTMENT (OUTPATIENT)
Dept: OBGYN | Facility: CLINIC | Age: 39
End: 2024-08-01
Payer: COMMERCIAL

## 2024-08-01 ENCOUNTER — ASOB RESULT (OUTPATIENT)
Age: 39
End: 2024-08-01

## 2024-08-01 VITALS — SYSTOLIC BLOOD PRESSURE: 118 MMHG | WEIGHT: 204 LBS | BODY MASS INDEX: 32.93 KG/M2 | DIASTOLIC BLOOD PRESSURE: 80 MMHG

## 2024-08-01 PROCEDURE — 0502F SUBSEQUENT PRENATAL CARE: CPT

## 2024-08-01 PROCEDURE — 76813 OB US NUCHAL MEAS 1 GEST: CPT

## 2024-08-13 ENCOUNTER — APPOINTMENT (OUTPATIENT)
Dept: SURGERY | Facility: CLINIC | Age: 39
End: 2024-08-13
Payer: COMMERCIAL

## 2024-08-13 ENCOUNTER — LABORATORY RESULT (OUTPATIENT)
Age: 39
End: 2024-08-13

## 2024-08-13 DIAGNOSIS — C73 MALIGNANT NEOPLASM OF THYROID GLAND: ICD-10-CM

## 2024-08-13 DIAGNOSIS — E06.3 AUTOIMMUNE THYROIDITIS: ICD-10-CM

## 2024-08-13 PROCEDURE — 36415 COLL VENOUS BLD VENIPUNCTURE: CPT

## 2024-08-13 PROCEDURE — 99214 OFFICE O/P EST MOD 30 MIN: CPT | Mod: 25

## 2024-08-13 NOTE — HISTORY OF PRESENT ILLNESS
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 denies dysphagia, hoarseness or new lesions.  continues Synthroid 200 mcg daily.  seen at [facility] who did not feel YANG was indicated, but was ultimately treated with 30 mCi.   last sonogram BLESSING. 13 weeks pregnant.   I have reviewed all old and new data and available images.

## 2024-08-13 NOTE — PHYSICAL EXAM
[de-identified] : scar flat, well healed [de-identified] : no palpable thyroid nodules [Laryngoscopy Performed] : laryngoscopy was performed, see procedure section for findings [Midline] : located in midline position [Normal] : orientation to person, place, and time: normal

## 2024-08-14 LAB
T3 SERPL-MCNC: 184 NG/DL
T4 FREE SERPL-MCNC: 1.3 NG/DL
THYROGLOB AB SERPL-ACNC: <20 IU/ML
THYROGLOB SERPL-MCNC: <0.2 NG/ML
TSH SERPL-ACNC: 0.25 UIU/ML

## 2024-08-20 ENCOUNTER — NON-APPOINTMENT (OUTPATIENT)
Age: 39
End: 2024-08-20

## 2024-08-20 ENCOUNTER — APPOINTMENT (OUTPATIENT)
Dept: CARDIOLOGY | Facility: CLINIC | Age: 39
End: 2024-08-20
Payer: COMMERCIAL

## 2024-08-20 VITALS
DIASTOLIC BLOOD PRESSURE: 80 MMHG | HEART RATE: 84 BPM | WEIGHT: 208 LBS | BODY MASS INDEX: 33.57 KG/M2 | OXYGEN SATURATION: 97 % | SYSTOLIC BLOOD PRESSURE: 110 MMHG

## 2024-08-20 DIAGNOSIS — R06.09 OTHER FORMS OF DYSPNEA: ICD-10-CM

## 2024-08-20 PROCEDURE — 99205 OFFICE O/P NEW HI 60 MIN: CPT | Mod: 25

## 2024-08-20 PROCEDURE — 93306 TTE W/DOPPLER COMPLETE: CPT

## 2024-08-20 PROCEDURE — 93000 ELECTROCARDIOGRAM COMPLETE: CPT

## 2024-08-20 NOTE — HISTORY OF PRESENT ILLNESS
[FreeTextEntry1] : 39-year-old , now 15 weeks pregnant after IVF presents for cardiac evaluation related to concerns about exertional dyspnea.  She has enjoyed excellent general health.  In December 2020 she underwent preventative double mastectomy.  In April 2022 she underwent resection of papillary thyroid carcinoma followed by radiation in October of that year.  For the past 5 weeks she reports that she is "out of breath a little" after walking her dog up a specific hill or climbing 1 flight in her office.  No associated symptoms.  No c/o chest, throat,jaw, arm or upper back discomfort.  No orthopnea (1 pillow) or PND.  No palpitations, dizziness or syncope.  No edema or claudication.  Weight is increased from 180 to 205 pounds over the past several weeks.  2011 related to the stress in her first position as an  she developed chest pain.  Stress testing was normal and EGD revealed "irritation".  She lost weight and her symptoms resolved.  There is no history of hypertension, rheumatic fever or heart murmur.  In September 2021 total cholesterol is 161 in July of this year caden to 243.  LDL caden from  and HDL 53-70.  Triglycerides were normal.  Never smoked and only social alcohol which she stopped when she began IVF.  Both parents are in their 60s.  Her father has hypertension and mother's hyperlipidemia.  Paternal grandfather suffered several MIs but he was a smoker.

## 2024-08-20 NOTE — REVIEW OF SYSTEMS
[TextEntry] : GEN: Denies appetite change, unusual weakness, bleeding, fever, chills, recent trauma or infection. Weight has been stable.  HEENT: no vision change or epistaxis LUNGS: No cough or hemoptysis GASTROINTESTINAL: No abdominal pain, nausea, vomiting, hematemesis, diarrhea, constipation, hematochezia, or melena. : No dysuria or frequency. No hematuria. Nocturia 0 SKIN: Denies rash, easy bruising or pruritus. BJE:  no back pain. No cramps or myalgias. NEURO: Denies headache or vertigo. No amaurosis. No new focal motor, sensory or speech symptoms. PSYCH: No anxiety or depression.

## 2024-08-20 NOTE — PLAN
[TextEntry] : Follow weight and exercise guidelines per Dr. Estrella. No additional cardiac testing is warranted at this juncture. Follow-up as needed

## 2024-08-20 NOTE — ASSESSMENT
[FreeTextEntry1] : Mild exertional dyspnea without evidence of structural heart or vascular disease.  No apparent dysrhythmia.  As she suspects, her weight gain is likely a factor.

## 2024-08-20 NOTE — PHYSICAL EXAM
[TextEntry] : GEN: pleasant, well-developed. No distress. No icterus or pallor. HEENT: Normal conjunctiva and mucous membranes; no xanthelasma. NECK: Thyroidectomy scar.  No JVD. 2+ equal carotid upstrokes without bruits. No thyromegaly. CHEST: Lungs clear to percussion and auscultation. CARDIAC: PMI not palpable. S1 and S2 normal,  no murmur, gallop, click or rub ABDOMEN: Soft, nontender, no organomegaly. The abdominal aorta is not palpable. No bruits. EXTREMITIES: No cyanosis clubbing or edema. Pedal pulses are 2+ and equal NEURO: Alert and oriented x3. Recent memory appears normal . No focal weakness. No gait or speech disturbance.

## 2024-08-21 ENCOUNTER — NON-APPOINTMENT (OUTPATIENT)
Age: 39
End: 2024-08-21

## 2024-08-23 ENCOUNTER — APPOINTMENT (OUTPATIENT)
Dept: OBGYN | Facility: CLINIC | Age: 39
End: 2024-08-23
Payer: COMMERCIAL

## 2024-08-23 ENCOUNTER — ASOB RESULT (OUTPATIENT)
Age: 39
End: 2024-08-23

## 2024-08-23 VITALS — DIASTOLIC BLOOD PRESSURE: 85 MMHG | WEIGHT: 208 LBS | BODY MASS INDEX: 33.57 KG/M2 | SYSTOLIC BLOOD PRESSURE: 129 MMHG

## 2024-08-23 DIAGNOSIS — Z3A.16 16 WEEKS GESTATION OF PREGNANCY: ICD-10-CM

## 2024-08-23 DIAGNOSIS — O09.519 SUPERVISION OF ELDERLY PRIMIGRAVIDA, UNSPECIFIED TRIMESTER: ICD-10-CM

## 2024-08-23 PROCEDURE — 0502F SUBSEQUENT PRENATAL CARE: CPT

## 2024-08-23 PROCEDURE — 76815 OB US LIMITED FETUS(S): CPT

## 2024-08-26 LAB
AF-AFP DISCLAIMER: NORMAL
AFP  MOM: 1.26
AFP CONCENTRATION: 30.98 NG/ML
AFP INTERPRETATION: NORMAL
AFP MOM CUT-OFF: 2.5
AFP PERCENTILE: 76.1
AFP SCREENING RESULT: NORMAL
AFTER SCREENING RISK OPEN SPINA BIFIDA: NORMAL
BEFORE SCREENING RISK OPEN SPINA BIFIDA: NORMAL
CARBAMAZEPINE?: NO
CURRENT SMOKER: NORMAL
ESTIMATED DUE DATE: NORMAL
EXTREME ANALYTE ALERT: NO
FAMILY HISTORY OPEN SPINA BIFIDA: NORMAL
GESTATIONAL  AGE: NORMAL
GESTATIONAL AGE METHOD: NORMAL
INSULIN DEPEND DIABETES: NORMAL
MATERNAL WGT: 208
MULTIPLE PREGNANCY STATUS: NORMAL
RACE/ETHNICITY: NORMAL
VALPROIC ACID?: NORMAL

## 2024-08-30 ENCOUNTER — NON-APPOINTMENT (OUTPATIENT)
Age: 39
End: 2024-08-30

## 2024-08-31 ENCOUNTER — EMERGENCY (EMERGENCY)
Facility: HOSPITAL | Age: 39
LOS: 1 days | Discharge: HOME | End: 2024-08-31
Attending: STUDENT IN AN ORGANIZED HEALTH CARE EDUCATION/TRAINING PROGRAM
Payer: COMMERCIAL

## 2024-08-31 VITALS
WEIGHT: 210.1 LBS | HEIGHT: 66 IN | SYSTOLIC BLOOD PRESSURE: 116 MMHG | DIASTOLIC BLOOD PRESSURE: 79 MMHG | OXYGEN SATURATION: 98 % | TEMPERATURE: 98 F | HEART RATE: 102 BPM | RESPIRATION RATE: 18 BRPM

## 2024-08-31 VITALS
TEMPERATURE: 98 F | SYSTOLIC BLOOD PRESSURE: 115 MMHG | HEART RATE: 100 BPM | RESPIRATION RATE: 19 BRPM | OXYGEN SATURATION: 99 % | DIASTOLIC BLOOD PRESSURE: 76 MMHG

## 2024-08-31 DIAGNOSIS — Z90.13 ACQUIRED ABSENCE OF BILATERAL BREASTS AND NIPPLES: Chronic | ICD-10-CM

## 2024-08-31 LAB
ADD ON TEST-SPECIMEN IN LAB: SIGNIFICANT CHANGE UP
ALBUMIN SERPL ELPH-MCNC: 3.4 G/DL — SIGNIFICANT CHANGE UP (ref 3.3–5)
ALP SERPL-CCNC: 59 U/L — SIGNIFICANT CHANGE UP (ref 40–120)
ALT FLD-CCNC: 11 U/L — SIGNIFICANT CHANGE UP (ref 10–45)
ANION GAP SERPL CALC-SCNC: 15 MMOL/L — SIGNIFICANT CHANGE UP (ref 5–17)
APPEARANCE UR: CLEAR — SIGNIFICANT CHANGE UP
AST SERPL-CCNC: 18 U/L — SIGNIFICANT CHANGE UP (ref 10–40)
BACTERIA # UR AUTO: ABNORMAL /HPF
BASOPHILS # BLD AUTO: 0.03 K/UL — SIGNIFICANT CHANGE UP (ref 0–0.2)
BASOPHILS NFR BLD AUTO: 0.3 % — SIGNIFICANT CHANGE UP (ref 0–2)
BILIRUB SERPL-MCNC: 0.1 MG/DL — LOW (ref 0.2–1.2)
BILIRUB UR-MCNC: NEGATIVE — SIGNIFICANT CHANGE UP
BUN SERPL-MCNC: 8 MG/DL — SIGNIFICANT CHANGE UP (ref 7–23)
CALCIUM SERPL-MCNC: 9.3 MG/DL — SIGNIFICANT CHANGE UP (ref 8.4–10.5)
CAST: 0 /LPF — SIGNIFICANT CHANGE UP (ref 0–4)
CHLORIDE SERPL-SCNC: 100 MMOL/L — SIGNIFICANT CHANGE UP (ref 96–108)
CO2 SERPL-SCNC: 18 MMOL/L — LOW (ref 22–31)
COLOR SPEC: YELLOW — SIGNIFICANT CHANGE UP
CREAT SERPL-MCNC: 0.45 MG/DL — LOW (ref 0.5–1.3)
DIFF PNL FLD: NEGATIVE — SIGNIFICANT CHANGE UP
EGFR: 126 ML/MIN/1.73M2 — SIGNIFICANT CHANGE UP
EOSINOPHIL # BLD AUTO: 0.06 K/UL — SIGNIFICANT CHANGE UP (ref 0–0.5)
EOSINOPHIL NFR BLD AUTO: 0.5 % — SIGNIFICANT CHANGE UP (ref 0–6)
GLUCOSE SERPL-MCNC: 111 MG/DL — HIGH (ref 70–99)
GLUCOSE UR QL: NEGATIVE MG/DL — SIGNIFICANT CHANGE UP
HCT VFR BLD CALC: 36.3 % — SIGNIFICANT CHANGE UP (ref 34.5–45)
HGB BLD-MCNC: 11.8 G/DL — SIGNIFICANT CHANGE UP (ref 11.5–15.5)
IMM GRANULOCYTES NFR BLD AUTO: 0.8 % — SIGNIFICANT CHANGE UP (ref 0–0.9)
KETONES UR-MCNC: NEGATIVE MG/DL — SIGNIFICANT CHANGE UP
LEUKOCYTE ESTERASE UR-ACNC: ABNORMAL
LYMPHOCYTES # BLD AUTO: 0.62 K/UL — LOW (ref 1–3.3)
LYMPHOCYTES # BLD AUTO: 5.3 % — LOW (ref 13–44)
MAGNESIUM SERPL-MCNC: 1.8 MG/DL — SIGNIFICANT CHANGE UP (ref 1.6–2.6)
MCHC RBC-ENTMCNC: 26.8 PG — LOW (ref 27–34)
MCHC RBC-ENTMCNC: 32.5 GM/DL — SIGNIFICANT CHANGE UP (ref 32–36)
MCV RBC AUTO: 82.3 FL — SIGNIFICANT CHANGE UP (ref 80–100)
MONOCYTES # BLD AUTO: 0.75 K/UL — SIGNIFICANT CHANGE UP (ref 0–0.9)
MONOCYTES NFR BLD AUTO: 6.4 % — SIGNIFICANT CHANGE UP (ref 2–14)
NEUTROPHILS # BLD AUTO: 10.15 K/UL — HIGH (ref 1.8–7.4)
NEUTROPHILS NFR BLD AUTO: 86.7 % — HIGH (ref 43–77)
NITRITE UR-MCNC: NEGATIVE — SIGNIFICANT CHANGE UP
NRBC # BLD: 0 /100 WBCS — SIGNIFICANT CHANGE UP (ref 0–0)
PH UR: 6.5 — SIGNIFICANT CHANGE UP (ref 5–8)
PLATELET # BLD AUTO: 328 K/UL — SIGNIFICANT CHANGE UP (ref 150–400)
POTASSIUM SERPL-MCNC: 3.6 MMOL/L — SIGNIFICANT CHANGE UP (ref 3.5–5.3)
POTASSIUM SERPL-SCNC: 3.6 MMOL/L — SIGNIFICANT CHANGE UP (ref 3.5–5.3)
PROT SERPL-MCNC: 6.5 G/DL — SIGNIFICANT CHANGE UP (ref 6–8.3)
PROT UR-MCNC: NEGATIVE MG/DL — SIGNIFICANT CHANGE UP
RBC # BLD: 4.41 M/UL — SIGNIFICANT CHANGE UP (ref 3.8–5.2)
RBC # FLD: 12.1 % — SIGNIFICANT CHANGE UP (ref 10.3–14.5)
RBC CASTS # UR COMP ASSIST: 3 /HPF — SIGNIFICANT CHANGE UP (ref 0–4)
REVIEW: SIGNIFICANT CHANGE UP
SODIUM SERPL-SCNC: 133 MMOL/L — LOW (ref 135–145)
SP GR SPEC: 1.01 — SIGNIFICANT CHANGE UP (ref 1–1.03)
SQUAMOUS # UR AUTO: 3 /HPF — SIGNIFICANT CHANGE UP (ref 0–5)
UROBILINOGEN FLD QL: 0.2 MG/DL — SIGNIFICANT CHANGE UP (ref 0.2–1)
WBC # BLD: 11.7 K/UL — HIGH (ref 3.8–10.5)
WBC # FLD AUTO: 11.7 K/UL — HIGH (ref 3.8–10.5)
WBC UR QL: 5 /HPF — SIGNIFICANT CHANGE UP (ref 0–5)

## 2024-08-31 PROCEDURE — 84443 ASSAY THYROID STIM HORMONE: CPT

## 2024-08-31 PROCEDURE — 93005 ELECTROCARDIOGRAM TRACING: CPT

## 2024-08-31 PROCEDURE — 80053 COMPREHEN METABOLIC PANEL: CPT

## 2024-08-31 PROCEDURE — 81001 URINALYSIS AUTO W/SCOPE: CPT

## 2024-08-31 PROCEDURE — 99285 EMERGENCY DEPT VISIT HI MDM: CPT | Mod: 25

## 2024-08-31 PROCEDURE — 93308 TTE F-UP OR LMTD: CPT

## 2024-08-31 PROCEDURE — 85025 COMPLETE CBC W/AUTO DIFF WBC: CPT

## 2024-08-31 PROCEDURE — 83735 ASSAY OF MAGNESIUM: CPT

## 2024-08-31 PROCEDURE — 87086 URINE CULTURE/COLONY COUNT: CPT

## 2024-08-31 PROCEDURE — 76815 OB US LIMITED FETUS(S): CPT | Mod: 26

## 2024-08-31 PROCEDURE — 76815 OB US LIMITED FETUS(S): CPT

## 2024-08-31 PROCEDURE — 93308 TTE F-UP OR LMTD: CPT | Mod: 26

## 2024-08-31 PROCEDURE — 99285 EMERGENCY DEPT VISIT HI MDM: CPT

## 2024-08-31 PROCEDURE — 36415 COLL VENOUS BLD VENIPUNCTURE: CPT

## 2024-08-31 RX ORDER — SODIUM CHLORIDE 9 MG/ML
1000 INJECTION INTRAMUSCULAR; INTRAVENOUS; SUBCUTANEOUS ONCE
Refills: 0 | Status: COMPLETED | OUTPATIENT
Start: 2024-08-31 | End: 2024-08-31

## 2024-08-31 RX ADMIN — SODIUM CHLORIDE 1000 MILLILITER(S): 9 INJECTION INTRAMUSCULAR; INTRAVENOUS; SUBCUTANEOUS at 11:44

## 2024-08-31 NOTE — ED PROVIDER NOTE - CLINICAL SUMMARY MEDICAL DECISION MAKING FREE TEXT BOX
Patient is a 39-year-old female past medical history thyroid cancer status post thyroid surgery, 17 weeks pregnant by ultrasound presents from urgent care complaining of presyncopal episode.  Patient notes that for the past 2 weeks she has been having some "itchiness in the back of her throat".  She states over the past 2 days she has been having some headache/sinus pressure and rhinorrhea and cough.  She called her OB who told her she can take Augmentin.  She went to urgent care today and had a positive COVID swab and after that she got lightheaded and nauseous and felt like she was not a pass out so they took her blood pressure there which was 80s over 60s and she states her heart rate was also fast but does not recall the number. Reports that she had a PCR there and was told she is COVID-positive. States she received fluids prior to arrival and improved since then. Patient is a 39-year-old female past medical history thyroid cancer status post thyroid surgery, 17 weeks pregnant by ultrasound presents from urgent care complaining of presyncopal episode.  Patient notes that for the past 2 weeks she has been having some "itchiness in the back of her throat".  She states over the past 2 days she has been having some headache/sinus pressure and rhinorrhea and cough.  She called her OB who told her she can take Augmentin.  She went to urgent care today and had a positive COVID swab and after that she got lightheaded and nauseous and felt like she was not a pass out so they took her blood pressure there which was 80s over 60s and she states her heart rate was also fast but does not recall the number. Reports that she had a PCR there and was told she is COVID-positive. States she received fluids prior to arrival and improved since then.    Attending Brook: See attending attestation.

## 2024-08-31 NOTE — ED PROVIDER NOTE - NSFOLLOWUPINSTRUCTIONS_ED_ALL_ED_FT
You were evaluated in the ED after a near passing out episode in the setting of a covid-19 infection.    Your labs did not show any emergent findings.    1) Follow up with your doctor this week  2) Return to the ED immediately for new or worsening symptoms  3) Please continue to take any home medications as prescribed  4) Your test results from your ED visit were discussed with you prior to discharge  5) You were provided with a copy of your test results  6) Please stay well hydrated    Syncope    Syncope is when you temporarily lose consciousness, also called fainting or passing out. It is caused by a sudden decrease in blood flow to the brain. Even though most causes of syncope are not dangerous, syncope can possibly be a sign of a serious medical problem. Signs that you may be about to faint include feeling dizzy, lightheaded, nausea, visual changes, or cold/clammy skin. Do not drive, operate heavy machinery, or play sports until your health care provider says it is okay.    SEEK IMMEDIATE MEDICAL CARE IF YOU HAVE ANY OF THE FOLLOWING SYMPTOMS: severe headache, pain in your chest/abdomen/back, bleeding from your mouth or rectum, palpitations, shortness of breath, pain with breathing, seizure, confusion, or trouble walking.

## 2024-08-31 NOTE — ED PROVIDER NOTE - PROGRESS NOTE DETAILS
Ramon Covington DO (PGY-2) Patient well appearing. POCUS by attending with . No pericardial effusion. Attending Carolinao: Labs w/ no emergent findings. UA w/ small leuks, some bacteria, epithelial cells. Discussed results w/ pt. pt reports feeling much better and would like to go home. reports did not wipe prior to given urine sample. culture will be followed up. return precautions provided, ready for DC.

## 2024-08-31 NOTE — ED PROVIDER NOTE - PHYSICAL EXAMINATION
GENERAL: NAD  HEENT:  Atraumatic  CHEST/LUNG: Chest rise equal bilaterally no w/r/r  HEART: Regular rate and rhythm normal s1/s2 no m/r/g  ABDOMEN: Soft, Nontender, Nondistended  EXTREMITIES:  Extremities warm  PSYCH: A&Ox3  SKIN: No obvious rashes or lesions  MSK: No cervical spine TTP, able to range neck to the left and right/ No midline spinal TTP/ No swelling, redness, pain or discharge from   NEUROLOGY: strength and sensation intact in all extremities. CN 2 - 12 intact. Finger to nose test intact. No pronator drift. Ambulatory without difficulty.

## 2024-08-31 NOTE — ED PROVIDER NOTE - PATIENT PORTAL LINK FT
You can access the FollowMyHealth Patient Portal offered by [facility] by registering at the following website: [email or web address]. By joining [facility]’s FollowMyHealth portal, you will also be able to view your health information using other applications (apps) compatible with our system.

## 2024-08-31 NOTE — ED PROVIDER NOTE - ATTENDING CONTRIBUTION TO CARE
Attending Brook: I performed a history and physical exam of the patient and discussed their management with the resident/fellow/student. I have reviewed the resident/fellow/student note and agree with the documented findings and plan of care, except as noted. I have personally performed a substantive portion of the visit including all aspects of the medical decision making. My medical decision making and observations are found below. Please refer to any progress notes for updates on clinical course. My notes supersedes the above resident/fellow/student note in case of discrepancy    MDM:  40 y/o F w/ PMH of thyroid cancer status post thyroid surgery, IVF approx 17 weeks pregnant by ultrasound presents from urgent care complaining of presyncopal episode. BIBEMS from urgent care. Patient notes that for the past 2 weeks she has been having some "itchiness in the back of her throat".  She states over the past 2 days she has been having some headache/sinus pressure and rhinorrhea and cough.  She called her OB who told her she can take Augmentin.  She went to urgent care today and had a positive COVID swab and after that she got lightheaded and nauseous and felt like she was about to pass out so they took her blood pressure there which was 80s over 60s and she states her heart rate was also fast but does not recall the number. Reports that she had a PCR there and was told she is COVID-positive. States she received fluids prior to arrival and improved since then. Also reports was given covid and flu vaccine yesterday.    Gen: NAD, AOx3, able to make needs known, non-toxic  Head: NCAT  HEENT: EOMI, oral mucosa moist, normal conjunctiva  Lung: no respiratory distress, CTAB, no wheezes/rhonchi/rales B/L, speaking in full sentences  CV: RRR, no murmurs  Abd: non distended, soft, nontender, no guarding, no CVA tenderness  MSK: no visible deformities  Neuro: Appears non focal  Skin: Warm, well perfused  Psych: normal affect     Pt overall well appearing, no acute distress. Suspect likely vasovagal episode. BP improved s/p IVF prior to arrival. Will eval for metabolic abnormalities. Obtain EKG to screen for arrhythmia. Check FHR. Doubtful PE as vitals improved w/ minimal intervention. Will perform POCUS ECHO. Plan for labs, imaging, EKG, IVF. Will reassess the need for additional interventions as clinically warranted. Refer to any progress notes for updates on clinical course and as a continuation of this MDM.

## 2024-08-31 NOTE — ED ADULT NURSE NOTE - NS ED NURSE LEVEL OF CONSCIOUSNESS SPEECH
Consent 2/Introductory Paragraph: Mohs surgery was explained to the patient and consent was obtained. The risks, benefits and alternatives to therapy were discussed in detail. Specifically, the risks of infection, scarring, bleeding, prolonged wound healing, incomplete removal, allergy to anesthesia, nerve injury and recurrence were addressed. Prior to the procedure, the treatment site was clearly identified and confirmed by the patient. All components of Universal Protocol/PAUSE Rule completed. Speaking Coherently

## 2024-08-31 NOTE — ED ADULT NURSE NOTE - OBJECTIVE STATEMENT
PT is a 39 year old A&OX4 female with PMH of hypothyroidism who presents to the ED via EMS from Urgent Care with c/o near-syncope. Per EMS, PT is 17 weeks pregnant via IVF and had her COVID and Flu vaccines yesterday. PT presented to Urgent Care today where she tested positive for COVID. Per EMS, PT had near-syncopal episode at Urgent Care where her blood pressure dropped to 80/40 and PT felt as if she was going to faint but did not, which is why EMS was called. PT endorsing throat discomfort for over a week, which is why she presented to . PT denies chest pain, SOB, N/V/D, dizziness, fevers, and chills. PT states she feels back to her baseline. PT is resting comfortably in bed, breathing unlabored on room air, and speaking in complete sentences. Abdomen is soft, non-tender, and non-distended. Skin is warm and dry, no diaphoresis noted. No edema noted to B/L extremities. Strong strength in B/L extremities, sensation intact. IV access established 20G in RAC by Urgent Care. PT placed in hospital gown. PT ambulatory with steady gait. Safety and comfort maintained.

## 2024-09-02 LAB
CULTURE RESULTS: SIGNIFICANT CHANGE UP
SPECIMEN SOURCE: SIGNIFICANT CHANGE UP

## 2024-09-20 ENCOUNTER — ASOB RESULT (OUTPATIENT)
Age: 39
End: 2024-09-20

## 2024-09-20 ENCOUNTER — APPOINTMENT (OUTPATIENT)
Dept: ANTEPARTUM | Facility: CLINIC | Age: 39
End: 2024-09-20

## 2024-09-20 PROCEDURE — 76811 OB US DETAILED SNGL FETUS: CPT

## 2024-09-23 ENCOUNTER — APPOINTMENT (OUTPATIENT)
Dept: OBGYN | Facility: CLINIC | Age: 39
End: 2024-09-23
Payer: COMMERCIAL

## 2024-09-23 VITALS — DIASTOLIC BLOOD PRESSURE: 76 MMHG | BODY MASS INDEX: 34.38 KG/M2 | WEIGHT: 213 LBS | SYSTOLIC BLOOD PRESSURE: 112 MMHG

## 2024-09-23 DIAGNOSIS — Z3A.20 20 WEEKS GESTATION OF PREGNANCY: ICD-10-CM

## 2024-09-23 DIAGNOSIS — O09.519 SUPERVISION OF ELDERLY PRIMIGRAVIDA, UNSPECIFIED TRIMESTER: ICD-10-CM

## 2024-09-23 DIAGNOSIS — O09.819 SUPERVISION OF PREGNANCY RESULTING FROM ASSISTED REPRODUCTIVE TECHNOLOGY, UNSPECIFIED TRIMESTER: ICD-10-CM

## 2024-09-23 PROCEDURE — 0502F SUBSEQUENT PRENATAL CARE: CPT

## 2024-10-01 ENCOUNTER — NON-APPOINTMENT (OUTPATIENT)
Age: 39
End: 2024-10-01

## 2024-10-04 NOTE — ASU PATIENT PROFILE, ADULT - NSICDXPASTMEDICALHX_GEN_ALL_CORE_FT
Received refill request for metoprolol succinate (TOPROL XL) 50 MG extended release tablet and Atorvastatin 80mg tablets,  Clopidogrel 75mg tablets and losartan 100mg tablets from [facility] pharmacy.     Last OV: 2023 BNN    Next OV: 2024 [facility]    Last Labs: 2024 CMP    Last EK2024     Last Filled: 10- Metoprolol - BNN                     2023 Atorvastatin - BNN                     2023 Losartan - BNN   PAST MEDICAL HISTORY:  Atypical hyperplasia of breast     Hypothyroid

## 2024-10-08 ENCOUNTER — APPOINTMENT (OUTPATIENT)
Dept: PEDIATRIC CARDIOLOGY | Facility: CLINIC | Age: 39
End: 2024-10-08
Payer: COMMERCIAL

## 2024-10-08 PROCEDURE — 99203 OFFICE O/P NEW LOW 30 MIN: CPT | Mod: 25

## 2024-10-08 PROCEDURE — 76827 ECHO EXAM OF FETAL HEART: CPT

## 2024-10-08 PROCEDURE — 76820 UMBILICAL ARTERY ECHO: CPT

## 2024-10-08 PROCEDURE — 93325 DOPPLER ECHO COLOR FLOW MAPG: CPT | Mod: 59

## 2024-10-08 PROCEDURE — 76825 ECHO EXAM OF FETAL HEART: CPT

## 2024-10-22 ENCOUNTER — APPOINTMENT (OUTPATIENT)
Dept: OBGYN | Facility: CLINIC | Age: 39
End: 2024-10-22
Payer: COMMERCIAL

## 2024-10-22 ENCOUNTER — ASOB RESULT (OUTPATIENT)
Age: 39
End: 2024-10-22

## 2024-10-22 VITALS
SYSTOLIC BLOOD PRESSURE: 113 MMHG | HEIGHT: 66 IN | BODY MASS INDEX: 34.07 KG/M2 | DIASTOLIC BLOOD PRESSURE: 75 MMHG | WEIGHT: 212 LBS

## 2024-10-22 DIAGNOSIS — O24.419 GESTATIONAL DIABETES MELLITUS IN PREGNANCY, UNSPECIFIED CONTROL: ICD-10-CM

## 2024-10-22 DIAGNOSIS — Z3A.24 24 WEEKS GESTATION OF PREGNANCY: ICD-10-CM

## 2024-10-22 PROCEDURE — 0502F SUBSEQUENT PRENATAL CARE: CPT

## 2024-10-22 PROCEDURE — 76816 OB US FOLLOW-UP PER FETUS: CPT

## 2024-11-12 ENCOUNTER — NON-APPOINTMENT (OUTPATIENT)
Age: 39
End: 2024-11-12

## 2024-11-15 ENCOUNTER — APPOINTMENT (OUTPATIENT)
Dept: OBGYN | Facility: CLINIC | Age: 39
End: 2024-11-15
Payer: COMMERCIAL

## 2024-11-15 ENCOUNTER — ASOB RESULT (OUTPATIENT)
Age: 39
End: 2024-11-15

## 2024-11-15 VITALS
HEIGHT: 66 IN | WEIGHT: 215 LBS | SYSTOLIC BLOOD PRESSURE: 114 MMHG | DIASTOLIC BLOOD PRESSURE: 79 MMHG | BODY MASS INDEX: 34.55 KG/M2

## 2024-11-15 DIAGNOSIS — Z3A.28 28 WEEKS GESTATION OF PREGNANCY: ICD-10-CM

## 2024-11-15 DIAGNOSIS — O09.519 SUPERVISION OF ELDERLY PRIMIGRAVIDA, UNSPECIFIED TRIMESTER: ICD-10-CM

## 2024-11-15 DIAGNOSIS — Z23 ENCOUNTER FOR IMMUNIZATION: ICD-10-CM

## 2024-11-15 PROCEDURE — 90715 TDAP VACCINE 7 YRS/> IM: CPT

## 2024-11-15 PROCEDURE — 90471 IMMUNIZATION ADMIN: CPT

## 2024-11-15 PROCEDURE — 0502F SUBSEQUENT PRENATAL CARE: CPT

## 2024-11-15 PROCEDURE — 76816 OB US FOLLOW-UP PER FETUS: CPT

## 2024-11-17 LAB
BASOPHILS # BLD AUTO: 0.04 K/UL
BASOPHILS NFR BLD AUTO: 0.3 %
BLD GP AB SCN SERPL QL: NORMAL
EOSINOPHIL # BLD AUTO: 0.08 K/UL
EOSINOPHIL NFR BLD AUTO: 0.7 %
HCT VFR BLD CALC: 37.4 %
HGB BLD-MCNC: 11.8 G/DL
HIV1+2 AB SPEC QL IA.RAPID: NONREACTIVE
IMM GRANULOCYTES NFR BLD AUTO: 1.3 %
LYMPHOCYTES # BLD AUTO: 1.67 K/UL
LYMPHOCYTES NFR BLD AUTO: 14 %
MAN DIFF?: NORMAL
MCHC RBC-ENTMCNC: 25.3 PG
MCHC RBC-ENTMCNC: 31.6 G/DL
MCV RBC AUTO: 80.1 FL
MONOCYTES # BLD AUTO: 0.78 K/UL
MONOCYTES NFR BLD AUTO: 6.5 %
NEUTROPHILS # BLD AUTO: 9.2 K/UL
NEUTROPHILS NFR BLD AUTO: 77.2 %
PLATELET # BLD AUTO: 365 K/UL
RBC # BLD: 4.67 M/UL
RBC # FLD: 14 %
T PALLIDUM AB SER QL IA: NEGATIVE
WBC # FLD AUTO: 11.92 K/UL

## 2024-11-19 PROBLEM — Z23 ENCOUNTER FOR IMMUNIZATION: Status: ACTIVE | Noted: 2022-10-05 | Resolved: 2024-12-03

## 2024-11-27 ENCOUNTER — NON-APPOINTMENT (OUTPATIENT)
Age: 39
End: 2024-11-27

## 2024-11-29 ENCOUNTER — ASOB RESULT (OUTPATIENT)
Age: 39
End: 2024-11-29

## 2024-11-29 ENCOUNTER — APPOINTMENT (OUTPATIENT)
Dept: OBGYN | Facility: CLINIC | Age: 39
End: 2024-11-29
Payer: COMMERCIAL

## 2024-11-29 VITALS
SYSTOLIC BLOOD PRESSURE: 107 MMHG | DIASTOLIC BLOOD PRESSURE: 70 MMHG | HEIGHT: 66 IN | BODY MASS INDEX: 34.55 KG/M2 | WEIGHT: 215 LBS

## 2024-11-29 PROCEDURE — 0502F SUBSEQUENT PRENATAL CARE: CPT

## 2024-11-29 PROCEDURE — 76816 OB US FOLLOW-UP PER FETUS: CPT

## 2024-11-29 PROCEDURE — 76819 FETAL BIOPHYS PROFIL W/O NST: CPT

## 2024-12-08 ENCOUNTER — OUTPATIENT (OUTPATIENT)
Dept: OUTPATIENT SERVICES | Facility: HOSPITAL | Age: 39
LOS: 1 days | End: 2024-12-08
Payer: COMMERCIAL

## 2024-12-08 VITALS — HEART RATE: 88 BPM | TEMPERATURE: 98 F | SYSTOLIC BLOOD PRESSURE: 114 MMHG | DIASTOLIC BLOOD PRESSURE: 61 MMHG

## 2024-12-08 VITALS — OXYGEN SATURATION: 98 % | HEART RATE: 103 BPM

## 2024-12-08 DIAGNOSIS — O26.899 OTHER SPECIFIED PREGNANCY RELATED CONDITIONS, UNSPECIFIED TRIMESTER: ICD-10-CM

## 2024-12-08 DIAGNOSIS — Z90.13 ACQUIRED ABSENCE OF BILATERAL BREASTS AND NIPPLES: Chronic | ICD-10-CM

## 2024-12-08 LAB
ALBUMIN SERPL ELPH-MCNC: 3.2 G/DL — LOW (ref 3.3–5)
ALP SERPL-CCNC: 94 U/L — SIGNIFICANT CHANGE UP (ref 40–120)
ALT FLD-CCNC: 6 U/L — LOW (ref 10–45)
ANION GAP SERPL CALC-SCNC: 14 MMOL/L — SIGNIFICANT CHANGE UP (ref 5–17)
APTT BLD: 28.9 SEC — SIGNIFICANT CHANGE UP (ref 24.5–35.6)
AST SERPL-CCNC: 12 U/L — SIGNIFICANT CHANGE UP (ref 10–40)
BASOPHILS # BLD AUTO: 0.04 K/UL — SIGNIFICANT CHANGE UP (ref 0–0.2)
BASOPHILS NFR BLD AUTO: 0.4 % — SIGNIFICANT CHANGE UP (ref 0–2)
BILIRUB SERPL-MCNC: <0.1 MG/DL — LOW (ref 0.2–1.2)
BLD GP AB SCN SERPL QL: NEGATIVE — SIGNIFICANT CHANGE UP
BUN SERPL-MCNC: 9 MG/DL — SIGNIFICANT CHANGE UP (ref 7–23)
CALCIUM SERPL-MCNC: 9.2 MG/DL — SIGNIFICANT CHANGE UP (ref 8.4–10.5)
CHLORIDE SERPL-SCNC: 104 MMOL/L — SIGNIFICANT CHANGE UP (ref 96–108)
CO2 SERPL-SCNC: 20 MMOL/L — LOW (ref 22–31)
CREAT SERPL-MCNC: 0.57 MG/DL — SIGNIFICANT CHANGE UP (ref 0.5–1.3)
EGFR: 119 ML/MIN/1.73M2 — SIGNIFICANT CHANGE UP
EOSINOPHIL # BLD AUTO: 0.08 K/UL — SIGNIFICANT CHANGE UP (ref 0–0.5)
EOSINOPHIL NFR BLD AUTO: 0.7 % — SIGNIFICANT CHANGE UP (ref 0–6)
FIBRINOGEN PPP-MCNC: 483 MG/DL — HIGH (ref 200–445)
GLUCOSE SERPL-MCNC: 127 MG/DL — HIGH (ref 70–99)
HCT VFR BLD CALC: 35.1 % — SIGNIFICANT CHANGE UP (ref 34.5–45)
HGB BLD-MCNC: 11.1 G/DL — LOW (ref 11.5–15.5)
IMM GRANULOCYTES NFR BLD AUTO: 1.2 % — HIGH (ref 0–0.9)
INR BLD: 0.96 RATIO — SIGNIFICANT CHANGE UP (ref 0.85–1.16)
LYMPHOCYTES # BLD AUTO: 1.93 K/UL — SIGNIFICANT CHANGE UP (ref 1–3.3)
LYMPHOCYTES # BLD AUTO: 17.6 % — SIGNIFICANT CHANGE UP (ref 13–44)
MCHC RBC-ENTMCNC: 25.1 PG — LOW (ref 27–34)
MCHC RBC-ENTMCNC: 31.6 G/DL — LOW (ref 32–36)
MCV RBC AUTO: 79.2 FL — LOW (ref 80–100)
MONOCYTES # BLD AUTO: 0.65 K/UL — SIGNIFICANT CHANGE UP (ref 0–0.9)
MONOCYTES NFR BLD AUTO: 5.9 % — SIGNIFICANT CHANGE UP (ref 2–14)
NEUTROPHILS # BLD AUTO: 8.16 K/UL — HIGH (ref 1.8–7.4)
NEUTROPHILS NFR BLD AUTO: 74.2 % — SIGNIFICANT CHANGE UP (ref 43–77)
NRBC # BLD: 0 /100 WBCS — SIGNIFICANT CHANGE UP (ref 0–0)
PLATELET # BLD AUTO: 318 K/UL — SIGNIFICANT CHANGE UP (ref 150–400)
POTASSIUM SERPL-MCNC: 3.9 MMOL/L — SIGNIFICANT CHANGE UP (ref 3.5–5.3)
POTASSIUM SERPL-SCNC: 3.9 MMOL/L — SIGNIFICANT CHANGE UP (ref 3.5–5.3)
PROT SERPL-MCNC: 5.9 G/DL — LOW (ref 6–8.3)
PROTHROM AB SERPL-ACNC: 10.9 SEC — SIGNIFICANT CHANGE UP (ref 9.9–13.4)
RBC # BLD: 4.43 M/UL — SIGNIFICANT CHANGE UP (ref 3.8–5.2)
RBC # FLD: 14.2 % — SIGNIFICANT CHANGE UP (ref 10.3–14.5)
RH IG SCN BLD-IMP: POSITIVE — SIGNIFICANT CHANGE UP
SODIUM SERPL-SCNC: 138 MMOL/L — SIGNIFICANT CHANGE UP (ref 135–145)
WBC # BLD: 10.99 K/UL — HIGH (ref 3.8–10.5)
WBC # FLD AUTO: 10.99 K/UL — HIGH (ref 3.8–10.5)

## 2024-12-08 PROCEDURE — 85610 PROTHROMBIN TIME: CPT

## 2024-12-08 PROCEDURE — 85384 FIBRINOGEN ACTIVITY: CPT

## 2024-12-08 PROCEDURE — 59025 FETAL NON-STRESS TEST: CPT

## 2024-12-08 PROCEDURE — 86901 BLOOD TYPING SEROLOGIC RH(D): CPT

## 2024-12-08 PROCEDURE — G0463: CPT

## 2024-12-08 PROCEDURE — 99213 OFFICE O/P EST LOW 20 MIN: CPT | Mod: 25

## 2024-12-08 PROCEDURE — 85730 THROMBOPLASTIN TIME PARTIAL: CPT

## 2024-12-08 PROCEDURE — 85025 COMPLETE CBC W/AUTO DIFF WBC: CPT

## 2024-12-08 PROCEDURE — 86900 BLOOD TYPING SEROLOGIC ABO: CPT

## 2024-12-08 PROCEDURE — 85460 HEMOGLOBIN FETAL: CPT

## 2024-12-08 PROCEDURE — 59025 FETAL NON-STRESS TEST: CPT | Mod: 26

## 2024-12-08 PROCEDURE — 86850 RBC ANTIBODY SCREEN: CPT

## 2024-12-08 PROCEDURE — 80053 COMPREHEN METABOLIC PANEL: CPT

## 2024-12-08 NOTE — OB PROVIDER TRIAGE NOTE - NSHPPHYSICALEXAM_GEN_ALL_CORE
CV: S1,S2  Pulmonary: Clear to auscultation bilaterally    Abdomen: Gravid abdomen  Extremities: Nontender to palpation bilaterally     EFM: 150hr, moderate variability, +accelerations, -decelerations      TOCO: Uterine Irritability   BSUS: CV: S1,S2  Pulmonary: Clear to auscultation bilaterally    Abdomen: Gravid abdomen  Extremities: Nontender to palpation bilaterally     EFM: 150hr, moderate variability, +accelerations, -decelerations      TOCO: Uterine Irritability   BSUS: Vertex, BPP: 8/8, SHANNAN: 11.4
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
## 2024-12-08 NOTE — OB RN TRIAGE NOTE - FALL HARM RISK - UNIVERSAL INTERVENTIONS
Bed in lowest position, wheels locked, appropriate side rails in place/Call bell, personal items and telephone in reach/Instruct patient to call for assistance before getting out of bed or chair/Non-slip footwear when patient is out of bed/[place] to call system/Physically safe environment - no spills, clutter or unnecessary equipment/Purposeful Proactive Rounding/Room/bathroom lighting operational, light cord in reach

## 2024-12-09 LAB — KLEIHAUER-BETKE CALCULATION: 0 % — SIGNIFICANT CHANGE UP (ref 0–0.2)

## 2024-12-10 DIAGNOSIS — O24.419 GESTATIONAL DIABETES MELLITUS IN PREGNANCY, UNSPECIFIED CONTROL: ICD-10-CM

## 2024-12-10 DIAGNOSIS — E03.9 HYPOTHYROIDISM, UNSPECIFIED: ICD-10-CM

## 2024-12-10 DIAGNOSIS — Z04.1 ENCOUNTER FOR EXAMINATION AND OBSERVATION FOLLOWING TRANSPORT ACCIDENT: ICD-10-CM

## 2024-12-10 DIAGNOSIS — O36.8330 MATERNAL CARE FOR ABNORMALITIES OF THE FETAL HEART RATE OR RHYTHM, THIRD TRIMESTER, NOT APPLICABLE OR UNSPECIFIED: ICD-10-CM

## 2024-12-10 DIAGNOSIS — O09.513 SUPERVISION OF ELDERLY PRIMIGRAVIDA, THIRD TRIMESTER: ICD-10-CM

## 2024-12-10 DIAGNOSIS — Z3A.31 31 WEEKS GESTATION OF PREGNANCY: ICD-10-CM

## 2024-12-10 DIAGNOSIS — O99.283 ENDOCRINE, NUTRITIONAL AND METABOLIC DISEASES COMPLICATING PREGNANCY, THIRD TRIMESTER: ICD-10-CM

## 2024-12-13 ENCOUNTER — APPOINTMENT (OUTPATIENT)
Dept: OBGYN | Facility: CLINIC | Age: 39
End: 2024-12-13
Payer: COMMERCIAL

## 2024-12-13 ENCOUNTER — ASOB RESULT (OUTPATIENT)
Age: 39
End: 2024-12-13

## 2024-12-13 VITALS — WEIGHT: 218 LBS | SYSTOLIC BLOOD PRESSURE: 117 MMHG | DIASTOLIC BLOOD PRESSURE: 79 MMHG | BODY MASS INDEX: 35.19 KG/M2

## 2024-12-13 DIAGNOSIS — O09.519 SUPERVISION OF ELDERLY PRIMIGRAVIDA, UNSPECIFIED TRIMESTER: ICD-10-CM

## 2024-12-13 DIAGNOSIS — O24.414 GESTATIONAL DIABETES MELLITUS IN PREGNANCY, INSULIN CONTROLLED: ICD-10-CM

## 2024-12-13 DIAGNOSIS — O09.819 SUPERVISION OF PREGNANCY RESULTING FROM ASSISTED REPRODUCTIVE TECHNOLOGY, UNSPECIFIED TRIMESTER: ICD-10-CM

## 2024-12-13 PROCEDURE — 0502F SUBSEQUENT PRENATAL CARE: CPT

## 2024-12-13 PROCEDURE — 76816 OB US FOLLOW-UP PER FETUS: CPT

## 2024-12-13 PROCEDURE — 76818 FETAL BIOPHYS PROFILE W/NST: CPT

## 2024-12-20 ENCOUNTER — APPOINTMENT (OUTPATIENT)
Dept: OBGYN | Facility: CLINIC | Age: 39
End: 2024-12-20
Payer: COMMERCIAL

## 2024-12-20 VITALS — SYSTOLIC BLOOD PRESSURE: 114 MMHG | BODY MASS INDEX: 34.86 KG/M2 | WEIGHT: 216 LBS | DIASTOLIC BLOOD PRESSURE: 77 MMHG

## 2024-12-20 PROCEDURE — 0502F SUBSEQUENT PRENATAL CARE: CPT

## 2024-12-20 PROCEDURE — 76819 FETAL BIOPHYS PROFIL W/O NST: CPT

## 2024-12-24 PROBLEM — F10.90 ALCOHOL USE: Status: ACTIVE | Noted: 2018-12-12

## 2024-12-27 ENCOUNTER — ASOB RESULT (OUTPATIENT)
Age: 39
End: 2024-12-27

## 2024-12-27 ENCOUNTER — APPOINTMENT (OUTPATIENT)
Dept: OBGYN | Facility: CLINIC | Age: 39
End: 2024-12-27
Payer: COMMERCIAL

## 2024-12-27 VITALS — BODY MASS INDEX: 35.19 KG/M2 | SYSTOLIC BLOOD PRESSURE: 121 MMHG | DIASTOLIC BLOOD PRESSURE: 81 MMHG | WEIGHT: 218 LBS

## 2024-12-27 PROCEDURE — 0502F SUBSEQUENT PRENATAL CARE: CPT

## 2024-12-27 PROCEDURE — 76816 OB US FOLLOW-UP PER FETUS: CPT

## 2024-12-27 PROCEDURE — 76818 FETAL BIOPHYS PROFILE W/NST: CPT

## 2025-01-03 ENCOUNTER — APPOINTMENT (OUTPATIENT)
Dept: OBGYN | Facility: CLINIC | Age: 40
End: 2025-01-03

## 2025-01-03 VITALS — BODY MASS INDEX: 35.19 KG/M2 | WEIGHT: 218 LBS | SYSTOLIC BLOOD PRESSURE: 118 MMHG | DIASTOLIC BLOOD PRESSURE: 80 MMHG

## 2025-01-03 PROCEDURE — 76819 FETAL BIOPHYS PROFIL W/O NST: CPT

## 2025-01-03 PROCEDURE — 0502F SUBSEQUENT PRENATAL CARE: CPT

## 2025-01-10 ENCOUNTER — APPOINTMENT (OUTPATIENT)
Dept: OBGYN | Facility: CLINIC | Age: 40
End: 2025-01-10
Payer: COMMERCIAL

## 2025-01-10 ENCOUNTER — ASOB RESULT (OUTPATIENT)
Age: 40
End: 2025-01-10

## 2025-01-10 VITALS
BODY MASS INDEX: 35.36 KG/M2 | SYSTOLIC BLOOD PRESSURE: 118 MMHG | DIASTOLIC BLOOD PRESSURE: 77 MMHG | HEIGHT: 66 IN | WEIGHT: 220 LBS

## 2025-01-10 DIAGNOSIS — J32.9 CHRONIC SINUSITIS, UNSPECIFIED: ICD-10-CM

## 2025-01-10 DIAGNOSIS — Z3A.36 36 WEEKS GESTATION OF PREGNANCY: ICD-10-CM

## 2025-01-10 PROCEDURE — 76816 OB US FOLLOW-UP PER FETUS: CPT

## 2025-01-10 PROCEDURE — 76818 FETAL BIOPHYS PROFILE W/NST: CPT

## 2025-01-10 PROCEDURE — 0502F SUBSEQUENT PRENATAL CARE: CPT

## 2025-01-10 RX ORDER — AMOXICILLIN AND CLAVULANATE POTASSIUM 500; 125 MG/1; MG/1
500-125 TABLET, FILM COATED ORAL
Qty: 14 | Refills: 0 | Status: ACTIVE | COMMUNITY
Start: 2025-01-10 | End: 1900-01-01

## 2025-01-13 LAB
GP B STREP DNA SPEC QL NAA+PROBE: NOT DETECTED
SOURCE GBS: NORMAL

## 2025-01-17 ENCOUNTER — APPOINTMENT (OUTPATIENT)
Dept: OBGYN | Facility: CLINIC | Age: 40
End: 2025-01-17
Payer: COMMERCIAL

## 2025-01-17 ENCOUNTER — ASOB RESULT (OUTPATIENT)
Age: 40
End: 2025-01-17

## 2025-01-17 VITALS — BODY MASS INDEX: 35.35 KG/M2 | WEIGHT: 219 LBS | SYSTOLIC BLOOD PRESSURE: 122 MMHG | DIASTOLIC BLOOD PRESSURE: 86 MMHG

## 2025-01-17 DIAGNOSIS — Z3A.36 36 WEEKS GESTATION OF PREGNANCY: ICD-10-CM

## 2025-01-17 DIAGNOSIS — O24.419 GESTATIONAL DIABETES MELLITUS IN PREGNANCY, UNSPECIFIED CONTROL: ICD-10-CM

## 2025-01-17 DIAGNOSIS — O09.519 SUPERVISION OF ELDERLY PRIMIGRAVIDA, UNSPECIFIED TRIMESTER: ICD-10-CM

## 2025-01-17 DIAGNOSIS — O09.819 SUPERVISION OF PREGNANCY RESULTING FROM ASSISTED REPRODUCTIVE TECHNOLOGY, UNSPECIFIED TRIMESTER: ICD-10-CM

## 2025-01-17 PROCEDURE — 0502F SUBSEQUENT PRENATAL CARE: CPT

## 2025-01-17 PROCEDURE — 76818 FETAL BIOPHYS PROFILE W/NST: CPT

## 2025-01-23 ENCOUNTER — NON-APPOINTMENT (OUTPATIENT)
Age: 40
End: 2025-01-23

## 2025-01-24 ENCOUNTER — APPOINTMENT (OUTPATIENT)
Dept: OBGYN | Facility: CLINIC | Age: 40
End: 2025-01-24
Payer: COMMERCIAL

## 2025-01-24 ENCOUNTER — ASOB RESULT (OUTPATIENT)
Age: 40
End: 2025-01-24

## 2025-01-24 VITALS — SYSTOLIC BLOOD PRESSURE: 120 MMHG | WEIGHT: 220 LBS | DIASTOLIC BLOOD PRESSURE: 83 MMHG | BODY MASS INDEX: 35.51 KG/M2

## 2025-01-24 PROCEDURE — 0502F SUBSEQUENT PRENATAL CARE: CPT

## 2025-01-24 PROCEDURE — 76816 OB US FOLLOW-UP PER FETUS: CPT

## 2025-01-24 PROCEDURE — 76818 FETAL BIOPHYS PROFILE W/NST: CPT

## 2025-01-28 ENCOUNTER — APPOINTMENT (OUTPATIENT)
Dept: SURGERY | Facility: CLINIC | Age: 40
End: 2025-01-28
Payer: COMMERCIAL

## 2025-01-28 DIAGNOSIS — C73 MALIGNANT NEOPLASM OF THYROID GLAND: ICD-10-CM

## 2025-01-28 DIAGNOSIS — O24.419 GESTATIONAL DIABETES MELLITUS IN PREGNANCY, UNSPECIFIED CONTROL: ICD-10-CM

## 2025-01-28 PROCEDURE — 99214 OFFICE O/P EST MOD 30 MIN: CPT

## 2025-01-30 ENCOUNTER — APPOINTMENT (OUTPATIENT)
Dept: OBGYN | Facility: CLINIC | Age: 40
End: 2025-01-30

## 2025-01-31 ENCOUNTER — ASOB RESULT (OUTPATIENT)
Age: 40
End: 2025-01-31

## 2025-01-31 ENCOUNTER — APPOINTMENT (OUTPATIENT)
Dept: OBGYN | Facility: CLINIC | Age: 40
End: 2025-01-31
Payer: COMMERCIAL

## 2025-01-31 ENCOUNTER — APPOINTMENT (OUTPATIENT)
Dept: OBGYN | Facility: HOSPITAL | Age: 40
End: 2025-01-31

## 2025-01-31 ENCOUNTER — NON-APPOINTMENT (OUTPATIENT)
Age: 40
End: 2025-01-31

## 2025-01-31 VITALS — SYSTOLIC BLOOD PRESSURE: 114 MMHG | DIASTOLIC BLOOD PRESSURE: 80 MMHG | WEIGHT: 221 LBS | BODY MASS INDEX: 35.67 KG/M2

## 2025-01-31 PROCEDURE — 0502F SUBSEQUENT PRENATAL CARE: CPT

## 2025-01-31 PROCEDURE — 76818 FETAL BIOPHYS PROFILE W/NST: CPT

## 2025-02-03 ENCOUNTER — APPOINTMENT (OUTPATIENT)
Dept: OBGYN | Facility: HOSPITAL | Age: 40
End: 2025-02-03
Payer: COMMERCIAL

## 2025-02-03 ENCOUNTER — INPATIENT (INPATIENT)
Facility: HOSPITAL | Age: 40
LOS: 1 days | Discharge: HOME | End: 2025-02-05
Attending: OBSTETRICS & GYNECOLOGY | Admitting: OBSTETRICS & GYNECOLOGY
Payer: COMMERCIAL

## 2025-02-03 VITALS
RESPIRATION RATE: 18 BRPM | DIASTOLIC BLOOD PRESSURE: 87 MMHG | SYSTOLIC BLOOD PRESSURE: 131 MMHG | HEART RATE: 97 BPM | TEMPERATURE: 98 F

## 2025-02-03 DIAGNOSIS — Z90.13 ACQUIRED ABSENCE OF BILATERAL BREASTS AND NIPPLES: Chronic | ICD-10-CM

## 2025-02-03 DIAGNOSIS — O24.414 GESTATIONAL DIABETES MELLITUS IN PREGNANCY, INSULIN CONTROLLED: ICD-10-CM

## 2025-02-03 LAB
BASOPHILS # BLD AUTO: 0.02 K/UL — SIGNIFICANT CHANGE UP (ref 0–0.2)
BASOPHILS NFR BLD AUTO: 0.2 % — SIGNIFICANT CHANGE UP (ref 0–2)
BLD GP AB SCN SERPL QL: NEGATIVE — SIGNIFICANT CHANGE UP
EOSINOPHIL # BLD AUTO: 0.08 K/UL — SIGNIFICANT CHANGE UP (ref 0–0.5)
EOSINOPHIL NFR BLD AUTO: 0.8 % — SIGNIFICANT CHANGE UP (ref 0–6)
GLUCOSE BLDC GLUCOMTR-MCNC: 91 MG/DL — SIGNIFICANT CHANGE UP (ref 70–99)
HCT VFR BLD CALC: 41.5 % — SIGNIFICANT CHANGE UP (ref 34.5–45)
HGB BLD-MCNC: 13.1 G/DL — SIGNIFICANT CHANGE UP (ref 11.5–15.5)
IMM GRANULOCYTES NFR BLD AUTO: 0.8 % — SIGNIFICANT CHANGE UP (ref 0–0.9)
LYMPHOCYTES # BLD AUTO: 1.8 K/UL — SIGNIFICANT CHANGE UP (ref 1–3.3)
LYMPHOCYTES # BLD AUTO: 18.1 % — SIGNIFICANT CHANGE UP (ref 13–44)
MCHC RBC-ENTMCNC: 24.6 PG — LOW (ref 27–34)
MCHC RBC-ENTMCNC: 31.6 G/DL — LOW (ref 32–36)
MCV RBC AUTO: 78 FL — LOW (ref 80–100)
MONOCYTES # BLD AUTO: 0.78 K/UL — SIGNIFICANT CHANGE UP (ref 0–0.9)
MONOCYTES NFR BLD AUTO: 7.8 % — SIGNIFICANT CHANGE UP (ref 2–14)
NEUTROPHILS # BLD AUTO: 7.21 K/UL — SIGNIFICANT CHANGE UP (ref 1.8–7.4)
NEUTROPHILS NFR BLD AUTO: 72.3 % — SIGNIFICANT CHANGE UP (ref 43–77)
NRBC # BLD: 0 /100 WBCS — SIGNIFICANT CHANGE UP (ref 0–0)
NRBC BLD-RTO: 0 /100 WBCS — SIGNIFICANT CHANGE UP (ref 0–0)
PLATELET # BLD AUTO: 350 K/UL — SIGNIFICANT CHANGE UP (ref 150–400)
RBC # BLD: 5.32 M/UL — HIGH (ref 3.8–5.2)
RBC # FLD: 15.4 % — HIGH (ref 10.3–14.5)
RH IG SCN BLD-IMP: POSITIVE — SIGNIFICANT CHANGE UP
WBC # BLD: 9.97 K/UL — SIGNIFICANT CHANGE UP (ref 3.8–10.5)
WBC # FLD AUTO: 9.97 K/UL — SIGNIFICANT CHANGE UP (ref 3.8–10.5)

## 2025-02-03 PROCEDURE — ZZZZZ: CPT

## 2025-02-03 RX ORDER — SODIUM CITRATE AND CITRIC ACID MONOHYDRATE 1002; 1500 MG/15ML; MG/15ML
15 SOLUTION ORAL EVERY 6 HOURS
Refills: 0 | Status: DISCONTINUED | OUTPATIENT
Start: 2025-02-03 | End: 2025-02-04

## 2025-02-03 RX ORDER — SODIUM CHLORIDE 9 G/ML
1000 INJECTION, SOLUTION INTRAVENOUS
Refills: 0 | Status: DISCONTINUED | OUTPATIENT
Start: 2025-02-03 | End: 2025-02-04

## 2025-02-03 RX ORDER — LEVOTHYROXINE SODIUM 25 UG/1
200 TABLET ORAL DAILY
Refills: 0 | Status: DISCONTINUED | OUTPATIENT
Start: 2025-02-03 | End: 2025-02-05

## 2025-02-03 RX ORDER — ANTISEPTIC SURGICAL SCRUB 0.04 MG/ML
1 SOLUTION TOPICAL DAILY
Refills: 0 | Status: DISCONTINUED | OUTPATIENT
Start: 2025-02-03 | End: 2025-02-04

## 2025-02-03 RX ORDER — BACTERIOSTATIC SODIUM CHLORIDE 0.9 %
1000 VIAL (ML) INJECTION
Refills: 0 | Status: DISCONTINUED | OUTPATIENT
Start: 2025-02-03 | End: 2025-02-04

## 2025-02-03 RX ORDER — OXYTOCIN/0.9 % SODIUM CHLORIDE 10/500ML
167 PLASTIC BAG, INJECTION (ML) INTRAVENOUS
Qty: 30 | Refills: 0 | Status: DISCONTINUED | OUTPATIENT
Start: 2025-02-03 | End: 2025-02-04

## 2025-02-03 RX ADMIN — Medication 125 MILLILITER(S): at 22:39

## 2025-02-03 RX ADMIN — SODIUM CHLORIDE 125 MILLILITER(S): 9 INJECTION, SOLUTION INTRAVENOUS at 22:39

## 2025-02-03 NOTE — OB RN PATIENT PROFILE - FALL HARM RISK - UNIVERSAL INTERVENTIONS
Bed in lowest position, wheels locked, appropriate side rails in place/Call bell, personal items and telephone in reach/Instruct patient to call for assistance before getting out of bed or chair/Non-slip footwear when patient is out of bed/[place] to call system/Physically safe environment - no spills, clutter or unnecessary equipment/Purposeful Proactive Rounding/Room/bathroom lighting operational, light cord in reach

## 2025-02-03 NOTE — OB RN PATIENT PROFILE - NSICDXPASTMEDICALHX_GEN_ALL_CORE_FT
PAST MEDICAL HISTORY:  Atypical hyperplasia of breast     Hypothyroid      PAST MEDICAL HISTORY:  Atypical hyperplasia of breast     GDM, class A2     H/O cervical polypectomy     H/O thyroidectomy     Hypothyroid     [place] product of IVF pregnancy
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
## 2025-02-03 NOTE — OB PROVIDER H&P - NSHPPHYSICALEXAM_GEN_ALL_CORE
CV: S1,S2  Pulmonary: Clear to auscultation bilaterally    Abdomen: Gravid abdomen  Extremities: Nontender to palpation bilaterally     EFM: 135, moderate variability, +accelerations, -decelerations      TOCO: Uterine Irritability CV: S1,S2  Pulmonary: Clear to auscultation bilaterally    Abdomen: Gravid abdomen  Extremities: Nontender to palpation bilaterally     EFM: 135, moderate variability, +accelerations, -decelerations      TOCO: Uterine Irritability  VE: 1/50/-3
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
## 2025-02-03 NOTE — OB RN PATIENT PROFILE - EDUCATION ON THE POTENTIAL RISKS AND IMPACT OF EARLY USE OF PACIFIERS ON THE ESTABLISHMENT OF BREASTFEEDING
Speech Language Therapy Discharge Summary    Reason for therapy discharge:    Discharged to long term care facility.    Progress towards therapy goal(s). See goals on Care Plan in Epic electronic health record for goal details.  Goals not met.  Barriers to achieving goals:   discharge from facility.    Therapy recommendation(s):    Continued therapy is recommended.  Rationale/Recommendations:  Recommend continue with Dysphagia Diet Level 1 (Puree) with NECTAR-thick liquids with the following safe swallowing strategies: upright for all po and remain upright for 30 minutes; small bites/sips; take a drink every 2-3 bites; check for oral residue. Due to fatigue, patient may benefit from smaller meals (2 food items and a beverage) and then have a snack between meals (I.e., magic cup/yogurt/pureed fruit and a beverage). Patient does need 1:1 assistance at meals for set up, placing food on spoon, and cueing for strategies..       Statement Selected

## 2025-02-04 ENCOUNTER — APPOINTMENT (OUTPATIENT)
Dept: OBGYN | Facility: CLINIC | Age: 40
End: 2025-02-04

## 2025-02-04 LAB
GLUCOSE BLDC GLUCOMTR-MCNC: 104 MG/DL — HIGH (ref 70–99)
GLUCOSE BLDC GLUCOMTR-MCNC: 111 MG/DL — HIGH (ref 70–99)
GLUCOSE BLDC GLUCOMTR-MCNC: 82 MG/DL — SIGNIFICANT CHANGE UP (ref 70–99)
T PALLIDUM AB TITR SER: NEGATIVE — SIGNIFICANT CHANGE UP

## 2025-02-04 PROCEDURE — 59400 OBSTETRICAL CARE: CPT

## 2025-02-04 RX ORDER — KETOROLAC TROMETHAMINE 10 MG
30 TABLET ORAL ONCE
Refills: 0 | Status: DISCONTINUED | OUTPATIENT
Start: 2025-02-04 | End: 2025-02-04

## 2025-02-04 RX ORDER — OXYCODONE HYDROCHLORIDE 30 MG/1
5 TABLET ORAL
Refills: 0 | Status: DISCONTINUED | OUTPATIENT
Start: 2025-02-04 | End: 2025-02-05

## 2025-02-04 RX ORDER — SODIUM CHLORIDE 9 G/ML
500 INJECTION, SOLUTION INTRAVENOUS ONCE
Refills: 0 | Status: COMPLETED | OUTPATIENT
Start: 2025-02-04 | End: 2025-02-04

## 2025-02-04 RX ORDER — MAGNESIUM HYDROXIDE 400 MG/5ML
30 SUSPENSION, ORAL (FINAL DOSE FORM) ORAL
Refills: 0 | Status: DISCONTINUED | OUTPATIENT
Start: 2025-02-04 | End: 2025-02-05

## 2025-02-04 RX ORDER — IBUPROFEN 600 MG/1
600 TABLET, FILM COATED ORAL EVERY 6 HOURS
Refills: 0 | Status: COMPLETED | OUTPATIENT
Start: 2025-02-04 | End: 2026-01-03

## 2025-02-04 RX ORDER — DIPHENHYDRAMINE HCL 25 MG
25 CAPSULE ORAL EVERY 6 HOURS
Refills: 0 | Status: DISCONTINUED | OUTPATIENT
Start: 2025-02-04 | End: 2025-02-05

## 2025-02-04 RX ORDER — PRAMOXINE HCL 1 %
1 CREAM (GRAM) RECTAL EVERY 4 HOURS
Refills: 0 | Status: DISCONTINUED | OUTPATIENT
Start: 2025-02-04 | End: 2025-02-05

## 2025-02-04 RX ORDER — OXYTOCIN/0.9 % SODIUM CHLORIDE 10/500ML
10 PLASTIC BAG, INJECTION (ML) INTRAVENOUS ONCE
Refills: 0 | Status: COMPLETED | OUTPATIENT
Start: 2025-02-04 | End: 2025-02-04

## 2025-02-04 RX ORDER — ACETAMINOPHEN 160 MG/5ML
975 SUSPENSION ORAL
Refills: 0 | Status: DISCONTINUED | OUTPATIENT
Start: 2025-02-04 | End: 2025-02-05

## 2025-02-04 RX ORDER — BACTERIOSTATIC SODIUM CHLORIDE 0.9 %
3 VIAL (ML) INJECTION EVERY 8 HOURS
Refills: 0 | Status: DISCONTINUED | OUTPATIENT
Start: 2025-02-04 | End: 2025-02-05

## 2025-02-04 RX ORDER — WITCH HAZEL 86 ML/100ML
1 OIL ORAL; TOPICAL EVERY 4 HOURS
Refills: 0 | Status: DISCONTINUED | OUTPATIENT
Start: 2025-02-04 | End: 2025-02-05

## 2025-02-04 RX ORDER — OXYTOCIN/0.9 % SODIUM CHLORIDE 10/500ML
167 PLASTIC BAG, INJECTION (ML) INTRAVENOUS
Qty: 30 | Refills: 0 | Status: DISCONTINUED | OUTPATIENT
Start: 2025-02-04 | End: 2025-02-05

## 2025-02-04 RX ORDER — IBUPROFEN 600 MG/1
600 TABLET, FILM COATED ORAL EVERY 6 HOURS
Refills: 0 | Status: DISCONTINUED | OUTPATIENT
Start: 2025-02-04 | End: 2025-02-05

## 2025-02-04 RX ORDER — BACTERIOSTATIC SODIUM CHLORIDE 0.9 %
500 VIAL (ML) INJECTION ONCE
Refills: 0 | Status: DISCONTINUED | OUTPATIENT
Start: 2025-02-04 | End: 2025-02-05

## 2025-02-04 RX ORDER — PNV WITH CALCIUM NO.11/IRON/FA 65 MG-1 MG
1 TABLET ORAL DAILY
Refills: 0 | Status: DISCONTINUED | OUTPATIENT
Start: 2025-02-04 | End: 2025-02-05

## 2025-02-04 RX ORDER — OXYCODONE HYDROCHLORIDE 30 MG/1
5 TABLET ORAL ONCE
Refills: 0 | Status: DISCONTINUED | OUTPATIENT
Start: 2025-02-04 | End: 2025-02-05

## 2025-02-04 RX ORDER — HYDROCORTISONE 1 %
1 CREAM (GRAM) TOPICAL EVERY 6 HOURS
Refills: 0 | Status: DISCONTINUED | OUTPATIENT
Start: 2025-02-04 | End: 2025-02-05

## 2025-02-04 RX ORDER — CLOSTRIDIUM TETANI TOXOID ANTIGEN (FORMALDEHYDE INACTIVATED), CORYNEBACTERIUM DIPHTHERIAE TOXOID ANTIGEN (FORMALDEHYDE INACTIVATED), BORDETELLA PERTUSSIS TOXOID ANTIGEN (GLUTARALDEHYDE INACTIVATED), BORDETELLA PERTUSSIS FILAMENTOUS HEMAGGLUTININ ANTIGEN (FORMALDEHYDE INACTIVATED), BORDETELLA PERTUSSIS PERTACTIN ANTIGEN, AND BORDETELLA PERTUSSIS FIMBRIAE 2/3 ANTIGEN 5; 2; 2.5; 5; 3; 5 [LF]/.5ML; [LF]/.5ML; UG/.5ML; UG/.5ML; UG/.5ML; UG/.5ML
0.5 INJECTION, SUSPENSION INTRAMUSCULAR ONCE
Refills: 0 | Status: DISCONTINUED | OUTPATIENT
Start: 2025-02-04 | End: 2025-02-05

## 2025-02-04 RX ORDER — OXYTOCIN/0.9 % SODIUM CHLORIDE 10/500ML
PLASTIC BAG, INJECTION (ML) INTRAVENOUS
Qty: 30 | Refills: 0 | Status: DISCONTINUED | OUTPATIENT
Start: 2025-02-04 | End: 2025-02-04

## 2025-02-04 RX ADMIN — LEVOTHYROXINE SODIUM 200 MICROGRAM(S): 25 TABLET ORAL at 07:09

## 2025-02-04 RX ADMIN — Medication 10 UNIT(S): at 09:45

## 2025-02-04 RX ADMIN — Medication 167 MILLIUNIT(S)/MIN: at 10:43

## 2025-02-04 RX ADMIN — SODIUM CHLORIDE 500 MILLILITER(S): 9 INJECTION, SOLUTION INTRAVENOUS at 11:16

## 2025-02-04 RX ADMIN — IBUPROFEN 600 MILLIGRAM(S): 600 TABLET, FILM COATED ORAL at 18:12

## 2025-02-04 RX ADMIN — Medication 30 MILLIGRAM(S): at 12:36

## 2025-02-04 RX ADMIN — Medication 2 MILLIUNIT(S)/MIN: at 06:57

## 2025-02-04 RX ADMIN — Medication 125 MILLILITER(S): at 03:45

## 2025-02-04 RX ADMIN — IBUPROFEN 600 MILLIGRAM(S): 600 TABLET, FILM COATED ORAL at 18:45

## 2025-02-04 NOTE — OB PROVIDER LABOR PROGRESS NOTE - NS_SUBJECTIVE/OBJECTIVE_OBGYN_ALL_OB_FT
Patient examined for rectal pressure.
R3 Labor Note    S: Patient evaluated at bedside for placement of CB. CB placed with 60cc/60cc NS. Patient tolerated placement well.    O:  T(C): 36.9 (02-03-25 @ 22:52), Max: 36.9 (02-03-25 @ 21:28)  HR: 90 (02-03-25 @ 23:14) (81 - 100)  BP: 131/87 (02-03-25 @ 22:52) (131/87 - 131/87)  RR: 18 (02-03-25 @ 22:52) (18 - 18)  SpO2: 100% (02-03-25 @ 23:14) (82% - 100%)
Patient seen and examined secondary to recurrent variable decels.  Effective epidural in situ.    T(C): 37.0 (02-04-25 @ 01:33), Max: 37.0 (02-04-25 @ 01:33)  HR: 90 (02-04-25 @ 05:10) (63 - 113)  BP: 134/82 (02-04-25 @ 05:10) (79/44 - 138/79)  RR: 18 (02-04-25 @ 01:33) (18 - 18)  SpO2: 98% (02-04-25 @ 05:09) (82% - 100%)
R1 Labor & Delivery Progress Note     Pt seen & examined at bedside for feeling rectal pressure    T(C): 36.8 (02-04-25 @ 07:27), Max: 37.0 (02-04-25 @ 01:33)  HR: 104 (02-04-25 @ 08:04) (63 - 123)  BP: 118/67 (02-04-25 @ 08:00) (79/44 - 159/84)  RR: 18 (02-04-25 @ 01:33) (18 - 18)  SpO2: 97% (02-04-25 @ 08:04) (82% - 100%)

## 2025-02-04 NOTE — OB PROVIDER DELIVERY SUMMARY - NSPROVIDERDELIVERYNOTE_OBGYN_ALL_OB_FT
Patient was found to be fully dilated and directed to push with contractions.  Spontaneous vaginal delivery of liveborn female from OA position.  Head, shoulders and body delivered easily.  Cord clamping was delayed 30 seconds to allow for private cord blood banking. Cord was cut.  Infant was passed to mother.  Placenta delivered spontaneously intact. Uterine massage was performed and pitocin was given IV and IM.  Vaginal sulci and cervix examined and noted to be intact. Small right vaginal wall laceration repaired with 2-0 Vicryl.  Fundus was firm. Second degree laceration repaired with 2-0 Vicryl.  1000mg rectal cytotec given for intermittent SHONDA atony with good response.  Good hemostasis was noted.  Count correct x2.     Delivered with Attending Dr. Rafa Gomez, PGY1 Patient was found to be fully dilated and directed to push with contractions.  Spontaneous vaginal delivery of liveborn female from OA position.  Head, shoulders and body delivered easily.  Cord clamping was delayed 30 seconds.  Cord was cut.  Infant was passed to mother.  Private cord blood and tissue banking collected.  Placenta delivered spontaneously intact. Uterine massage was performed and pitocin was given IV and IM.  Vaginal sulci and cervix examined and noted to be intact. Small right vaginal wall laceration repaired with 2-0 Vicryl.  Fundus was firm. Second degree laceration repaired with 2-0 Vicryl.  1000mg rectal cytotec given for intermittent SHONDA atony with good response.  Good hemostasis was noted.  Count correct x2.     Delivered with Attending Dr. Rafa Gomez, PGY1

## 2025-02-04 NOTE — OB PROVIDER LABOR PROGRESS NOTE - NS_OBIHIFHRDETAILS_OBGYN_ALL_OB_FT
140/mod/+accels/-decels
baseline 130/mod/-accels/-decels, discontinuous tracing
130/mod/+acels/-decels
baseline 125  mod nikolas  -accels  recurrent variable decels

## 2025-02-04 NOTE — OB PROVIDER LABOR PROGRESS NOTE - ASSESSMENT
39y  @39w4d here for IOL for GDMA2  Cervical balloon easily dislodged w/tension  VE /-3  D/c BC, 4pit  Cat II tracing overall reassuring iso mod nikolas  Reposition to resuscitate tracing  Cont fetal/maternal monitoring  Will reassess PRN    dw Dr. Seth David PGY1       
A/P 39y  @39w3d IOL for GDMA2  -IOL: CB in place, c/w BC  -Cat 1 tracing  -GBS neg  -EFW 3160  -A2 - NPO, c/w rotating fluids, FS per protocol  -Analgesia - not requesting pain mgmt at this time  -Anticipate     d/w Dr. Seth Ayon, PGY-3
A/P    Patient in stable condition. Patient fully dilated, feeling uncomfortable. Anesthesia called for top off, pitocin discontinued.     - Cont IOL  - Cont EFM, [place]  - Cont IVF    D/w Dr. Rafa Gomez PGY1
IOL for gDMA2   - Making cervical change  - Start pitocin  - Category 1 FHR     D/w Dr. Ann,   Vidya Patino, PGY2

## 2025-02-04 NOTE — PRE-ANESTHESIA EVALUATION ADULT - NSANTHALCOHOLSD_GEN_ALL_CORE
Detail Level: Generalized Detail Level: Zone Tazorac Pregnancy And Lactation Text: This medication is not safe during pregnancy. It is unknown if this medication is excreted in breast milk. Dapsone Counseling: I discussed with the patient the risks of dapsone including but not limited to hemolytic anemia, agranulocytosis, rashes, methemoglobinemia, kidney failure, peripheral neuropathy, headaches, GI upset, and liver toxicity. Patients who start dapsone require monitoring including baseline LFTs and weekly CBCs for the first month, then every month thereafter. The patient verbalized understanding of the proper use and possible adverse effects of dapsone. All of the patient's questions and concerns were addressed. Benzoyl Peroxide Pregnancy And Lactation Text: This medication is Pregnancy Category C. It is unknown if benzoyl peroxide is excreted in breast milk. Bactrim Counseling:  I discussed with the patient the risks of sulfa antibiotics including but not limited to GI upset, allergic reaction, drug rash, diarrhea, dizziness, photosensitivity, and yeast infections. Rarely, more serious reactions can occur including but not limited to aplastic anemia, agranulocytosis, methemoglobinemia, blood dyscrasias, liver or kidney failure, lung infiltrates or desquamative/blistering drug rashes. Erythromycin Counseling:  I discussed with the patient the risks of erythromycin including but not limited to GI upset, allergic reaction, drug rash, diarrhea, increase in liver enzymes, and yeast infections. Benzoyl Peroxide Counseling: Patient counseled that medicine may cause skin irritation and bleach clothing. In the event of skin irritation, the patient was advised to reduce the amount of the drug applied or use it less frequently. The patient verbalized understanding of the proper use and possible adverse effects of benzoyl peroxide. All of the patient's questions and concerns were addressed. Spironolactone Counseling: Patient advised regarding risks of diarrhea, abdominal pain, hyperkalemia, birth defects (for female patients), liver toxicity and renal toxicity. The patient may need blood work to monitor liver and kidney function and potassium levels while on therapy. The patient verbalized understanding of the proper use and possible adverse effects of spironolactone. All of the patient's questions and concerns were addressed. Use Enhanced Medication Counseling?: No High Dose Vitamin A Counseling: Side effects reviewed, pt to contact office should one occur. Bactrim Pregnancy And Lactation Text: This medication is Pregnancy Category D and is known to cause fetal risk. It is also excreted in breast milk. Patient Specific Counseling (Will Not Stick From Patient To Patient): Pt is to initiate epiduo forte to face daily. Minocycline Counseling: Patient advised regarding possible photosensitivity and discoloration of the teeth, skin, lips, tongue and gums. Patient instructed to avoid sunlight, if possible. When exposed to sunlight, patients should wear protective clothing, sunglasses, and sunscreen. The patient was instructed to call the office immediately if the following severe adverse effects occur:  hearing changes, easy bruising/bleeding, severe headache, or vision changes. The patient verbalized understanding of the proper use and possible adverse effects of minocycline. All of the patient's questions and concerns were addressed. No Topical Clindamycin Counseling: Patient counseled that this medication may cause skin irritation or allergic reactions. In the event of skin irritation, the patient was advised to reduce the amount of the drug applied or use it less frequently. The patient verbalized understanding of the proper use and possible adverse effects of clindamycin. All of the patient's questions and concerns were addressed. Topical Retinoid counseling:  Patient advised to apply a pea-sized amount only at bedtime and wait 30 minutes after washing their face before applying. If too drying, patient may add a non-comedogenic moisturizer. The patient verbalized understanding of the proper use and possible adverse effects of retinoids. All of the patient's questions and concerns were addressed. Spironolactone Pregnancy And Lactation Text: This medication can cause feminization of the male fetus and should be avoided during pregnancy. The active metabolite is also found in breast milk. High Dose Vitamin A Pregnancy And Lactation Text: High dose vitamin A therapy is contraindicated during pregnancy and breast feeding. Erythromycin Pregnancy And Lactation Text: This medication is Pregnancy Category B and is considered safe during pregnancy. It is also excreted in breast milk. Dapsone Pregnancy And Lactation Text: This medication is Pregnancy Category C and is not considered safe during pregnancy or breast feeding. Topical Clindamycin Pregnancy And Lactation Text: This medication is Pregnancy Category B and is considered safe during pregnancy. It is unknown if it is excreted in breast milk. Topical Sulfur Applications Pregnancy And Lactation Text: This medication is Pregnancy Category C and has an unknown safety profile during pregnancy. It is unknown if this topical medication is excreted in breast milk. Doxycycline Counseling:  Patient counseled regarding possible photosensitivity and increased risk for sunburn. Patient instructed to avoid sunlight, if possible. When exposed to sunlight, patients should wear protective clothing, sunglasses, and sunscreen. The patient was instructed to call the office immediately if the following severe adverse effects occur:  hearing changes, easy bruising/bleeding, severe headache, or vision changes. The patient verbalized understanding of the proper use and possible adverse effects of doxycycline. All of the patient's questions and concerns were addressed. Topical Retinoid Pregnancy And Lactation Text: This medication is Pregnancy Category C. It is unknown if this medication is excreted in breast milk. Tetracycline Pregnancy And Lactation Text: This medication is Pregnancy Category D and not consider safe during pregnancy. It is also excreted in breast milk. Azithromycin Counseling:  I discussed with the patient the risks of azithromycin including but not limited to GI upset, allergic reaction, drug rash, diarrhea, and yeast infections. Tetracycline Counseling: Patient counseled regarding possible photosensitivity and increased risk for sunburn. Patient instructed to avoid sunlight, if possible. When exposed to sunlight, patients should wear protective clothing, sunglasses, and sunscreen. The patient was instructed to call the office immediately if the following severe adverse effects occur:  hearing changes, easy bruising/bleeding, severe headache, or vision changes. The patient verbalized understanding of the proper use and possible adverse effects of tetracycline. All of the patient's questions and concerns were addressed. Patient understands to avoid pregnancy while on therapy due to potential birth defects. Birth Control Pills Counseling: Birth Control Pill Counseling: I discussed with the patient the potential side effects of OCPs including but not limited to increased risk of stroke, heart attack, thrombophlebitis, deep venous thrombosis, hepatic adenomas, breast changes, GI upset, headaches, and depression. The patient verbalized understanding of the proper use and possible adverse effects of OCPs. All of the patient's questions and concerns were addressed. Isotretinoin Counseling: Patient should get monthly blood tests, not donate blood, not drive at night if vision affected, not share medication, and not undergo elective surgery for 6 months after tx completed. Side effects reviewed, pt to contact office should one occur. Birth Control Pills Pregnancy And Lactation Text: This medication should be avoided if pregnant and for the first 30 days post-partum. Azithromycin Pregnancy And Lactation Text: This medication is considered safe during pregnancy and is also secreted in breast milk. Topical Sulfur Applications Counseling: Topical Sulfur Counseling: Patient counseled that this medication may cause skin irritation or allergic reactions. In the event of skin irritation, the patient was advised to reduce the amount of the drug applied or use it less frequently. The patient verbalized understanding of the proper use and possible adverse effects of topical sulfur application. All of the patient's questions and concerns were addressed. Tazorac Counseling:  Patient advised that medication is irritating and drying. Patient may need to apply sparingly and wash off after an hour before eventually leaving it on overnight. The patient verbalized understanding of the proper use and possible adverse effects of tazorac. All of the patient's questions and concerns were addressed. Isotretinoin Pregnancy And Lactation Text: This medication is Pregnancy Category X and is considered extremely dangerous during pregnancy. It is unknown if it is excreted in breast milk. Doxycycline Pregnancy And Lactation Text: This medication is Pregnancy Category D and not consider safe during pregnancy. It is also excreted in breast milk but is considered safe for shorter treatment courses. Patient Specific Counseling (Will Not Stick From Patient To Patient): Pt is to apply Ketoconazole to affected area bid for 2-4 weeks. Detail Level: Simple

## 2025-02-04 NOTE — PRE-ANESTHESIA EVALUATION ADULT - NSANTHPMHFT_GEN_ALL_CORE
pt with intermittent chronic back pain. denies radiculopathy     Denies history  neurological deficits, or bleeding disorders.     denies Aspirin, antiplatelet or anticoagulation

## 2025-02-04 NOTE — OB PROVIDER DELIVERY SUMMARY - NSSELHIDDEN_OBGYN_ALL_OB_FT
[[email or web address]_OB_FT:[id number]],[NS_[email or web address]_ALL_OB_FT:[id number]]

## 2025-02-04 NOTE — OB RN DELIVERY SUMMARY - NS_SEPSISRSKCALC_OBGYN_ALL_OB_FT
EOS calculated successfully. EOS Risk Factor: 0.5/1000 live births ([facility] national incidence); GA=39w4d; Temp=98.6; ROM=4.033; GBS='Negative'; Antibiotics='No antibiotics or any antibiotics < 2 hrs prior to birth'

## 2025-02-04 NOTE — OB RN DELIVERY SUMMARY - NSSELHIDDEN_OBGYN_ALL_OB_FT
[[email or web address]_OBGYN_ALL_OB_FT:[id number]=],[NS_DeliveryAssist1_OBGYN_ALL_OB_FT:[id number]],[NS_DeliveryRN_OBGYN_ALL_OB_FT:[id number]],[NS_CirculateRN2_OBGYN_ALL_OB_FT:[id number]]

## 2025-02-05 ENCOUNTER — TRANSCRIPTION ENCOUNTER (OUTPATIENT)
Age: 40
End: 2025-02-05

## 2025-02-05 VITALS
DIASTOLIC BLOOD PRESSURE: 72 MMHG | RESPIRATION RATE: 18 BRPM | SYSTOLIC BLOOD PRESSURE: 109 MMHG | TEMPERATURE: 98 F | HEART RATE: 90 BPM | OXYGEN SATURATION: 99 %

## 2025-02-05 PROCEDURE — 85025 COMPLETE CBC W/AUTO DIFF WBC: CPT

## 2025-02-05 PROCEDURE — 59050 FETAL MONITOR W/REPORT: CPT

## 2025-02-05 PROCEDURE — 86780 TREPONEMA PALLIDUM: CPT

## 2025-02-05 PROCEDURE — 86850 RBC ANTIBODY SCREEN: CPT

## 2025-02-05 PROCEDURE — 86900 BLOOD TYPING SEROLOGIC ABO: CPT

## 2025-02-05 PROCEDURE — 82962 GLUCOSE BLOOD TEST: CPT

## 2025-02-05 PROCEDURE — 86901 BLOOD TYPING SEROLOGIC RH(D): CPT

## 2025-02-05 RX ORDER — PNV WITH CALCIUM NO.11/IRON/FA 65 MG-1 MG
1 TABLET ORAL
Qty: 0 | Refills: 0 | DISCHARGE
Start: 2025-02-05

## 2025-02-05 RX ADMIN — IBUPROFEN 600 MILLIGRAM(S): 600 TABLET, FILM COATED ORAL at 01:44

## 2025-02-05 RX ADMIN — IBUPROFEN 600 MILLIGRAM(S): 600 TABLET, FILM COATED ORAL at 13:30

## 2025-02-05 RX ADMIN — ACETAMINOPHEN 975 MILLIGRAM(S): 160 SUSPENSION ORAL at 09:45

## 2025-02-05 RX ADMIN — IBUPROFEN 600 MILLIGRAM(S): 600 TABLET, FILM COATED ORAL at 05:24

## 2025-02-05 RX ADMIN — ACETAMINOPHEN 975 MILLIGRAM(S): 160 SUSPENSION ORAL at 09:08

## 2025-02-05 RX ADMIN — ACETAMINOPHEN 975 MILLIGRAM(S): 160 SUSPENSION ORAL at 03:56

## 2025-02-05 RX ADMIN — LEVOTHYROXINE SODIUM 200 MICROGRAM(S): 25 TABLET ORAL at 08:00

## 2025-02-05 RX ADMIN — IBUPROFEN 600 MILLIGRAM(S): 600 TABLET, FILM COATED ORAL at 00:44

## 2025-02-05 RX ADMIN — ACETAMINOPHEN 975 MILLIGRAM(S): 160 SUSPENSION ORAL at 02:56

## 2025-02-05 RX ADMIN — IBUPROFEN 600 MILLIGRAM(S): 600 TABLET, FILM COATED ORAL at 12:57

## 2025-02-05 RX ADMIN — IBUPROFEN 600 MILLIGRAM(S): 600 TABLET, FILM COATED ORAL at 06:24

## 2025-02-05 NOTE — DISCHARGE NOTE OB - FINANCIAL ASSISTANCE
[facility] provides services at a reduced cost to those who are determined to be eligible through [facility]’s financial assistance program. Information regarding [facility]’s financial assistance program can be found by going to [email or web address] or by calling [phone].

## 2025-02-05 NOTE — PROGRESS NOTE ADULT - SUBJECTIVE AND OBJECTIVE BOX
38yo  PPD#1 s/p . Patient feels well. Pain is well controlled, tolerating regular diet, passing flatus, voiding spontaneously, ambulating without difficulty. Denies heavy vaginal bleeding, CP/SOB, N/V, lightheadedness/dizziness.     O:  Vitals:  Vital Signs Last 24 Hrs  T(C): 36.5 (2025 05:30), Max: 37.2 (2025 14:32)  T(F): 97.7 (2025 05:30), Max: 98.9 (2025 14:32)  HR: 74 (2025 05:30) (74 - 157)  BP: 97/63 (2025 05:30) (88/50 - 143/70)  BP(mean): --  RR: 18 (2025 05:30) (16 - 18)  SpO2: 98% (2025 05:30) (81% - 100%)    Parameters below as of 2025 05:30  Patient On (Oxygen Delivery Method): room air        MEDICATIONS  (STANDING):  acetaminophen     Tablet .. 975 milliGRAM(s) Oral <User Schedule>  diphtheria/tetanus/pertussis (acellular) Vaccine (Adacel) 0.5 milliLiter(s) IntraMuscular once  ibuprofen  Tablet. 600 milliGRAM(s) Oral every 6 hours  levothyroxine 200 MICROGram(s) Oral daily  oxytocin Infusion 167 milliUNIT(s)/Min (167 mL/Hr) IV Continuous <Continuous>  prenatal multivitamin 1 Tablet(s) Oral daily  sodium chloride 0.9% Bolus 500 milliLiter(s) IV Bolus once  sodium chloride 0.9% lock flush 3 milliLiter(s) IV Push every 8 hours      MEDICATIONS  (PRN):  benzocaine 20%/menthol 0.5% Spray 1 Spray(s) Topical every 6 hours PRN for Perineal discomfort  dibucaine 1% Ointment 1 Application(s) Topical every 6 hours PRN Perineal discomfort  diphenhydrAMINE 25 milliGRAM(s) Oral every 6 hours PRN Pruritus  hydrocortisone 1% Cream 1 Application(s) Topical every 6 hours PRN Moderate Pain (4-6)  lanolin Ointment 1 Application(s) Topical every 6 hours PRN nipple soreness  magnesium hydroxide Suspension 30 milliLiter(s) Oral two times a day PRN Constipation  oxyCODONE    IR 5 milliGRAM(s) Oral every 3 hours PRN Moderate to Severe Pain (4-10)  oxyCODONE    IR 5 milliGRAM(s) Oral once PRN Moderate to Severe Pain (4-10)  pramoxine 1%/zinc 5% Cream 1 Application(s) Topical every 4 hours PRN Moderate Pain (4-6)  simethicone 80 milliGRAM(s) Chew every 4 hours PRN Gas  witch hazel Pads 1 Application(s) Topical every 4 hours PRN Perineal discomfort      Labs:  Blood type: AB Positive  Rubella IgG: RPR: Negative                          13.1   9.97 >-----------< 350    (  @ 22:50 )             41.5                  Physical Exam:  General: NAD  Abdomen: soft, non-tender, non-distended, fundus firm  Vaginal: No heavy vaginal bleeding  Extremities: No erythema/edema

## 2025-02-05 NOTE — PROGRESS NOTE ADULT - ASSESSMENT
38yo PPD#1 s/p .  Patient is stable and doing well post-partum.     - Pain well controlled, continue current pain regimen  - Increase ambulation, SCDs when not ambulating  - Continue regular diet    Chen Huertas, PGY-1

## 2025-02-05 NOTE — PROGRESS NOTE ADULT - SUBJECTIVE AND OBJECTIVE BOX
S: Patient doing well. Minimal lochia. Pain controlled.    O: Vital Signs Last 24 Hrs  T(C): 36.5 (2025 05:30), Max: 37.2 (2025 14:32)  T(F): 97.7 (2025 05:30), Max: 98.9 (2025 14:32)  HR: 74 (2025 05:30) (74 - 128)  BP: 97/63 (2025 05:30) (88/50 - 116/69)  BP(mean): --  RR: 18 (2025 05:30) (16 - 18)  SpO2: 98% (2025 05:30) (88% - 100%)    Parameters below as of 2025 05:30  Patient On (Oxygen Delivery Method): room air        Gen: NAD  Abd: soft, NT, ND, fundus firm below umbilicus  Lochia: normal  Ext: no tenderness    Labs:                        13.1   9.97  )-----------( 350      ( 2025 22:50 )             41.5       A: 39y PPD#1 s/p  doing well.    Plan: d-c home  full inst given  f-u in office 6 wks

## 2025-02-05 NOTE — DISCHARGE NOTE OB - CARE PROVIDER_API CALL
Lady Ham  Obstetrics and Gynecology  [address] [facility] [address] [facility]  [place] [address]  Phone: [phone]  Fax: [phone]  Follow Up Time:

## 2025-02-05 NOTE — DISCHARGE NOTE OB - PATIENT PORTAL LINK FT
You can access the FollowMyHealth Patient Portal offered by [facility] by registering at the following website: [email or web address]. By joining [employer]’s FollowMyHealth portal, you will also be able to view your health information using other applications (apps) compatible with our system.

## 2025-02-05 NOTE — DISCHARGE NOTE OB - MEDICATION SUMMARY - MEDICATIONS TO TAKE
I will START or STAY ON the medications listed below when I get home from the hospital:    Prenatal Multivitamins with Folic Acid 1 mg oral tablet  -- 1 tab(s) by mouth once a day  -- Indication: For Insulin controlled gestational diabetes mellitus (GDM) during pregnancy

## 2025-02-06 PROBLEM — Z98.890 OTHER SPECIFIED POSTPROCEDURAL STATES: Chronic | Status: ACTIVE | Noted: 2025-02-03

## 2025-02-06 PROBLEM — O24.419 GESTATIONAL DIABETES MELLITUS IN PREGNANCY, UNSPECIFIED CONTROL: Chronic | Status: ACTIVE | Noted: 2025-02-03

## 2025-03-11 ENCOUNTER — NON-APPOINTMENT (OUTPATIENT)
Age: 40
End: 2025-03-11

## 2025-03-11 ENCOUNTER — APPOINTMENT (OUTPATIENT)
Dept: OPHTHALMOLOGY | Facility: CLINIC | Age: 40
End: 2025-03-11
Payer: COMMERCIAL

## 2025-03-11 PROCEDURE — 92083 EXTENDED VISUAL FIELD XM: CPT

## 2025-03-11 PROCEDURE — 92133 CPTRZD OPH DX IMG PST SGM ON: CPT

## 2025-03-11 PROCEDURE — 92014 COMPRE OPH EXAM EST PT 1/>: CPT

## 2025-03-18 ENCOUNTER — APPOINTMENT (OUTPATIENT)
Dept: OBGYN | Facility: CLINIC | Age: 40
End: 2025-03-18

## 2025-03-19 ENCOUNTER — APPOINTMENT (OUTPATIENT)
Dept: OBGYN | Facility: CLINIC | Age: 40
End: 2025-03-19
Payer: COMMERCIAL

## 2025-03-19 ENCOUNTER — NON-APPOINTMENT (OUTPATIENT)
Age: 40
End: 2025-03-19

## 2025-03-19 VITALS
WEIGHT: 196 LBS | DIASTOLIC BLOOD PRESSURE: 61 MMHG | BODY MASS INDEX: 31.5 KG/M2 | HEIGHT: 66 IN | SYSTOLIC BLOOD PRESSURE: 92 MMHG

## 2025-03-19 DIAGNOSIS — Z86.32 PERSONAL HISTORY OF GESTATIONAL DIABETES: ICD-10-CM

## 2025-03-19 PROCEDURE — 0503F POSTPARTUM CARE VISIT: CPT

## 2025-03-19 RX ORDER — ETONOGESTREL AND ETHINYL ESTRADIOL 11.7; 2.7 MG/1; MG/1
0.12-0.015 INSERT, EXTENDED RELEASE VAGINAL
Qty: 3 | Refills: 3 | Status: ACTIVE | COMMUNITY
Start: 2025-03-19 | End: 1900-01-01

## 2025-07-20 LAB
ALBUMIN SERPL ELPH-MCNC: 4.3 G/DL
ALP BLD-CCNC: 63 U/L
ALT SERPL-CCNC: 11 U/L
ANION GAP SERPL CALC-SCNC: 12 MMOL/L
AST SERPL-CCNC: 17 U/L
BASOPHILS # BLD AUTO: 0.06 K/UL
BASOPHILS NFR BLD AUTO: 0.9 %
BILIRUB SERPL-MCNC: 0.2 MG/DL
BUN SERPL-MCNC: 11 MG/DL
CALCIUM SERPL-MCNC: 9.6 MG/DL
CHLORIDE SERPL-SCNC: 106 MMOL/L
CHOLEST SERPL-MCNC: 233 MG/DL
CO2 SERPL-SCNC: 22 MMOL/L
CREAT SERPL-MCNC: 0.63 MG/DL
EGFRCR SERPLBLD CKD-EPI 2021: 116 ML/MIN/1.73M2
EOSINOPHIL # BLD AUTO: 0.15 K/UL
EOSINOPHIL NFR BLD AUTO: 2.2 %
ESTIMATED AVERAGE GLUCOSE: 103 MG/DL
GLUCOSE SERPL-MCNC: 96 MG/DL
HBA1C MFR BLD HPLC: 5.2 %
HCT VFR BLD CALC: 42.1 %
HDLC SERPL-MCNC: 52 MG/DL
HGB BLD-MCNC: 13.3 G/DL
IMM GRANULOCYTES NFR BLD AUTO: 0.1 %
LDLC SERPL DIRECT ASSAY-MCNC: 144 MG/DL
LDLC SERPL-MCNC: 153 MG/DL
LYMPHOCYTES # BLD AUTO: 2 K/UL
LYMPHOCYTES NFR BLD AUTO: 29.3 %
MAN DIFF?: NORMAL
MCHC RBC-ENTMCNC: 26 PG
MCHC RBC-ENTMCNC: 31.6 G/DL
MCV RBC AUTO: 82.2 FL
MONOCYTES # BLD AUTO: 0.56 K/UL
MONOCYTES NFR BLD AUTO: 8.2 %
NEUTROPHILS # BLD AUTO: 4.04 K/UL
NEUTROPHILS NFR BLD AUTO: 59.3 %
NONHDLC SERPL-MCNC: 181 MG/DL
PLATELET # BLD AUTO: 428 K/UL
POTASSIUM SERPL-SCNC: 4.8 MMOL/L
PROT SERPL-MCNC: 6.8 G/DL
RBC # BLD: 5.12 M/UL
RBC # FLD: 14.1 %
SODIUM SERPL-SCNC: 140 MMOL/L
T4 FREE SERPL-MCNC: 1.1 NG/DL
TRIGL SERPL-MCNC: 155 MG/DL
TSH SERPL-ACNC: 5.08 UIU/ML
WBC # FLD AUTO: 6.82 K/UL

## 2025-07-29 ENCOUNTER — APPOINTMENT (OUTPATIENT)
Dept: INTERNAL MEDICINE | Facility: CLINIC | Age: 40
End: 2025-07-29
Payer: COMMERCIAL

## 2025-07-29 VITALS
BODY MASS INDEX: 28.93 KG/M2 | DIASTOLIC BLOOD PRESSURE: 68 MMHG | HEIGHT: 66 IN | OXYGEN SATURATION: 99 % | HEART RATE: 81 BPM | WEIGHT: 180 LBS | SYSTOLIC BLOOD PRESSURE: 96 MMHG

## 2025-07-29 DIAGNOSIS — Z3A.28 28 WEEKS GESTATION OF PREGNANCY: ICD-10-CM

## 2025-07-29 DIAGNOSIS — O24.419 GESTATIONAL DIABETES MELLITUS IN PREGNANCY, UNSPECIFIED CONTROL: ICD-10-CM

## 2025-07-29 DIAGNOSIS — Z80.3 FAMILY HISTORY OF MALIGNANT NEOPLASM OF BREAST: ICD-10-CM

## 2025-07-29 DIAGNOSIS — R21 RASH AND OTHER NONSPECIFIC SKIN ERUPTION: ICD-10-CM

## 2025-07-29 DIAGNOSIS — Z3A.16 16 WEEKS GESTATION OF PREGNANCY: ICD-10-CM

## 2025-07-29 DIAGNOSIS — E66.3 OVERWEIGHT: ICD-10-CM

## 2025-07-29 DIAGNOSIS — O24.414 GESTATIONAL DIABETES MELLITUS IN PREGNANCY, INSULIN CONTROLLED: ICD-10-CM

## 2025-07-29 DIAGNOSIS — O09.819 SUPERVISION OF PREGNANCY RESULTING FROM ASSISTED REPRODUCTIVE TECHNOLOGY, UNSPECIFIED TRIMESTER: ICD-10-CM

## 2025-07-29 DIAGNOSIS — Z3A.24 24 WEEKS GESTATION OF PREGNANCY: ICD-10-CM

## 2025-07-29 DIAGNOSIS — Z00.00 ENCOUNTER FOR GENERAL ADULT MEDICAL EXAMINATION W/OUT ABNORMAL FINDINGS: ICD-10-CM

## 2025-07-29 DIAGNOSIS — E78.5 HYPERLIPIDEMIA, UNSPECIFIED: ICD-10-CM

## 2025-07-29 DIAGNOSIS — Z3A.36 36 WEEKS GESTATION OF PREGNANCY: ICD-10-CM

## 2025-07-29 DIAGNOSIS — M54.12 RADICULOPATHY, CERVICAL REGION: ICD-10-CM

## 2025-07-29 DIAGNOSIS — Z3A.20 20 WEEKS GESTATION OF PREGNANCY: ICD-10-CM

## 2025-07-29 DIAGNOSIS — C73 MALIGNANT NEOPLASM OF THYROID GLAND: ICD-10-CM

## 2025-07-29 DIAGNOSIS — E03.9 HYPOTHYROIDISM, UNSPECIFIED: ICD-10-CM

## 2025-07-29 DIAGNOSIS — O09.519 SUPERVISION OF ELDERLY PRIMIGRAVIDA, UNSPECIFIED TRIMESTER: ICD-10-CM

## 2025-07-29 PROCEDURE — 99395 PREV VISIT EST AGE 18-39: CPT

## 2025-07-29 PROCEDURE — 93000 ELECTROCARDIOGRAM COMPLETE: CPT

## 2025-07-29 RX ORDER — LEVOTHYROXINE SODIUM 137 UG/1
137 TABLET ORAL
Refills: 0 | Status: ACTIVE | COMMUNITY

## 2025-07-29 RX ORDER — MULTIVITAMIN
TABLET ORAL
Refills: 0 | Status: ACTIVE | COMMUNITY

## 2025-08-25 ENCOUNTER — APPOINTMENT (OUTPATIENT)
Dept: OBGYN | Facility: CLINIC | Age: 40
End: 2025-08-25
Payer: COMMERCIAL

## 2025-08-25 VITALS — SYSTOLIC BLOOD PRESSURE: 122 MMHG | DIASTOLIC BLOOD PRESSURE: 81 MMHG

## 2025-08-25 PROCEDURE — 99213 OFFICE O/P EST LOW 20 MIN: CPT

## (undated) DEVICE — ELCTR GROUNDING PAD ADULT COVIDIEN

## (undated) DEVICE — DRSG BENZOIN 0.6CC

## (undated) DEVICE — VENODYNE/SCD SLEEVE CALF MEDIUM

## (undated) DEVICE — CANISTER DISPOSABLE THIN WALL 3000CC

## (undated) DEVICE — SAFETY PIN

## (undated) DEVICE — SOL IRR POUR H2O 500ML

## (undated) DEVICE — DRAPE LAPAROTOMY TRANSVERSE

## (undated) DEVICE — LABELS BLANK W PEN

## (undated) DEVICE — SUT SILK 2-0 18" FS

## (undated) DEVICE — BIPOLAR FORCEP KIRWAN CUSHING 6" X 1MM W 12FT CORD (GREEN)

## (undated) DEVICE — APPLICATOR Q TIP 6" WOOD STEM

## (undated) DEVICE — SUT PROLENE 0 30" CT-1

## (undated) DEVICE — SOL IRR POUR H2O 1500ML

## (undated) DEVICE — PACK HEAD & NECK

## (undated) DEVICE — LAP PAD W RING 18 X 18"

## (undated) DEVICE — DRAIN JACKSON PRATT 7MM FLAT FULL NO TROCAR

## (undated) DEVICE — SUT VICRYL 3-0 18" SH (POP-OFF)

## (undated) DEVICE — SUT VICRYL 3-0 18" TIES UNDYED

## (undated) DEVICE — DRAPE 3/4 SHEET 52X76"

## (undated) DEVICE — SUT CHROMIC 3-0 27" SH

## (undated) DEVICE — SUT MONOCRYL 4-0 27" PS-2 UNDYED

## (undated) DEVICE — SUT VICRYL 2-0 27" SH UNDYED

## (undated) DEVICE — PROTECTOR HEEL / ELBOW FLUFFY

## (undated) DEVICE — GLV 7 PROTEXIS (WHITE)

## (undated) DEVICE — DRAIN RESERVOIR FOR JACKSON PRATT 100CC CARDINAL

## (undated) DEVICE — SUT VICRYL 2-0 18" TIES UNDYED